# Patient Record
Sex: FEMALE | Race: BLACK OR AFRICAN AMERICAN | NOT HISPANIC OR LATINO | Employment: UNEMPLOYED | URBAN - METROPOLITAN AREA
[De-identification: names, ages, dates, MRNs, and addresses within clinical notes are randomized per-mention and may not be internally consistent; named-entity substitution may affect disease eponyms.]

---

## 2023-03-22 ENCOUNTER — PATIENT OUTREACH (OUTPATIENT)
Dept: CARE COORDINATION | Facility: CLINIC | Age: 23
End: 2023-03-22

## 2023-10-14 PROBLEM — E66.9 OBESITY: Status: ACTIVE | Noted: 2023-10-14

## 2023-10-14 PROBLEM — R41.840 ATTENTION DISTURBANCE: Status: ACTIVE | Noted: 2023-10-14

## 2023-10-14 RX ORDER — CETIRIZINE HYDROCHLORIDE, PSEUDOEPHEDRINE HYDROCHLORIDE 5; 120 MG/1; MG/1
TABLET, FILM COATED, EXTENDED RELEASE ORAL
COMMUNITY
Start: 2023-03-20

## 2023-10-14 RX ORDER — LABETALOL 200 MG/1
TABLET, FILM COATED ORAL
COMMUNITY
Start: 2023-03-20

## 2023-10-14 RX ORDER — ACETAMINOPHEN 325 MG/1
TABLET ORAL
COMMUNITY
Start: 2023-03-20

## 2023-10-14 RX ORDER — PRENATAL VIT NO.126/IRON/FOLIC 28MG-0.8MG
1 TABLET ORAL DAILY
COMMUNITY
Start: 2022-10-10

## 2023-10-14 RX ORDER — NIFEDIPINE 60 MG/1
TABLET, EXTENDED RELEASE ORAL
COMMUNITY
Start: 2023-03-20

## 2023-10-14 RX ORDER — TRIAMCINOLONE ACETONIDE 1 MG/G
CREAM TOPICAL
COMMUNITY
Start: 2016-01-04

## 2023-10-14 RX ORDER — ASPIRIN 81 MG/1
2 TABLET ORAL DAILY
COMMUNITY
Start: 2022-10-10

## 2023-10-14 RX ORDER — FERROUS SULFATE TAB 325 MG (65 MG ELEMENTAL FE) 325 (65 FE) MG
TAB ORAL
COMMUNITY
Start: 2023-03-20

## 2023-10-14 RX ORDER — DOXYLAMINE SUCCINATE 25 MG
TABLET ORAL
COMMUNITY
Start: 2022-11-09

## 2023-10-14 RX ORDER — POLYETHYLENE GLYCOL 3350 17 G/17G
POWDER, FOR SOLUTION ORAL
COMMUNITY
Start: 2023-03-20

## 2023-10-14 RX ORDER — ONDANSETRON 4 MG/1
8 TABLET, FILM COATED ORAL EVERY 8 HOURS
COMMUNITY
Start: 2022-10-10

## 2023-10-14 RX ORDER — KETOCONAZOLE 20 MG/ML
SHAMPOO, SUSPENSION TOPICAL
COMMUNITY
Start: 2016-01-04

## 2023-10-14 RX ORDER — PNV NO.95/FERROUS FUM/FOLIC AC 28MG-0.8MG
TABLET ORAL
COMMUNITY
Start: 2023-01-30

## 2023-10-14 RX ORDER — IBUPROFEN 600 MG/1
TABLET ORAL
COMMUNITY
Start: 2023-03-20

## 2023-10-16 ENCOUNTER — APPOINTMENT (OUTPATIENT)
Dept: OBSTETRICS AND GYNECOLOGY | Facility: CLINIC | Age: 23
End: 2023-10-16
Payer: COMMERCIAL

## 2023-10-30 ENCOUNTER — APPOINTMENT (OUTPATIENT)
Dept: OBSTETRICS AND GYNECOLOGY | Facility: CLINIC | Age: 23
End: 2023-10-30
Payer: COMMERCIAL

## 2023-10-30 ENCOUNTER — LAB (OUTPATIENT)
Dept: LAB | Facility: LAB | Age: 23
End: 2023-10-30
Payer: COMMERCIAL

## 2023-10-30 ENCOUNTER — OFFICE VISIT (OUTPATIENT)
Dept: OBSTETRICS AND GYNECOLOGY | Facility: CLINIC | Age: 23
End: 2023-10-30
Payer: COMMERCIAL

## 2023-10-30 ENCOUNTER — PHARMACY VISIT (OUTPATIENT)
Dept: PHARMACY | Facility: CLINIC | Age: 23
End: 2023-10-30
Payer: MEDICAID

## 2023-10-30 VITALS — WEIGHT: 270 LBS | DIASTOLIC BLOOD PRESSURE: 86 MMHG | SYSTOLIC BLOOD PRESSURE: 134 MMHG | BODY MASS INDEX: 43.7 KG/M2

## 2023-10-30 DIAGNOSIS — R03.0 ELEVATED BP WITHOUT DIAGNOSIS OF HYPERTENSION: ICD-10-CM

## 2023-10-30 DIAGNOSIS — L68.0 HIRSUTISM: ICD-10-CM

## 2023-10-30 DIAGNOSIS — E66.01 CLASS 3 SEVERE OBESITY WITHOUT SERIOUS COMORBIDITY WITH BODY MASS INDEX (BMI) OF 40.0 TO 44.9 IN ADULT, UNSPECIFIED OBESITY TYPE (MULTI): ICD-10-CM

## 2023-10-30 DIAGNOSIS — L68.0 HIRSUTISM: Primary | ICD-10-CM

## 2023-10-30 DIAGNOSIS — Z11.3 ROUTINE SCREENING FOR STI (SEXUALLY TRANSMITTED INFECTION): ICD-10-CM

## 2023-10-30 LAB
ALBUMIN SERPL BCP-MCNC: 4.5 G/DL (ref 3.4–5)
ALP SERPL-CCNC: 84 U/L (ref 33–110)
ALT SERPL W P-5'-P-CCNC: 16 U/L (ref 7–45)
ANION GAP SERPL CALC-SCNC: 14 MMOL/L (ref 10–20)
AST SERPL W P-5'-P-CCNC: 13 U/L (ref 9–39)
BASOPHILS # BLD AUTO: 0.01 X10*3/UL (ref 0–0.1)
BASOPHILS NFR BLD AUTO: 0.2 %
BILIRUB SERPL-MCNC: 0.2 MG/DL (ref 0–1.2)
BUN SERPL-MCNC: 16 MG/DL (ref 6–23)
CALCIUM SERPL-MCNC: 9.7 MG/DL (ref 8.6–10.6)
CHLORIDE SERPL-SCNC: 105 MMOL/L (ref 98–107)
CO2 SERPL-SCNC: 24 MMOL/L (ref 21–32)
CREAT SERPL-MCNC: 0.77 MG/DL (ref 0.5–1.05)
DHEA-S SERPL-MCNC: 442 UG/DL (ref 65–395)
EOSINOPHIL # BLD AUTO: 0.02 X10*3/UL (ref 0–0.7)
EOSINOPHIL NFR BLD AUTO: 0.3 %
ERYTHROCYTE [DISTWIDTH] IN BLOOD BY AUTOMATED COUNT: 15.1 % (ref 11.5–14.5)
EST. AVERAGE GLUCOSE BLD GHB EST-MCNC: 114 MG/DL
GFR SERPL CREATININE-BSD FRML MDRD: >90 ML/MIN/1.73M*2
GLUCOSE SERPL-MCNC: 85 MG/DL (ref 74–99)
HBA1C MFR BLD: 5.6 %
HCT VFR BLD AUTO: 33.7 % (ref 36–46)
HGB BLD-MCNC: 10.6 G/DL (ref 12–16)
HIV 1+2 AB+HIV1 P24 AG SERPL QL IA: NONREACTIVE
IMM GRANULOCYTES # BLD AUTO: 0.01 X10*3/UL (ref 0–0.7)
IMM GRANULOCYTES NFR BLD AUTO: 0.2 % (ref 0–0.9)
LYMPHOCYTES # BLD AUTO: 2.91 X10*3/UL (ref 1.2–4.8)
LYMPHOCYTES NFR BLD AUTO: 47.3 %
MCH RBC QN AUTO: 26.5 PG (ref 26–34)
MCHC RBC AUTO-ENTMCNC: 31.5 G/DL (ref 32–36)
MCV RBC AUTO: 84 FL (ref 80–100)
MONOCYTES # BLD AUTO: 0.26 X10*3/UL (ref 0.1–1)
MONOCYTES NFR BLD AUTO: 4.2 %
NEUTROPHILS # BLD AUTO: 2.94 X10*3/UL (ref 1.2–7.7)
NEUTROPHILS NFR BLD AUTO: 47.8 %
NRBC BLD-RTO: 0 /100 WBCS (ref 0–0)
PLATELET # BLD AUTO: 401 X10*3/UL (ref 150–450)
PMV BLD AUTO: 10.4 FL (ref 7.5–11.5)
POTASSIUM SERPL-SCNC: 4.5 MMOL/L (ref 3.5–5.3)
PROLACTIN SERPL-MCNC: 7.9 UG/L (ref 3–20)
PROT SERPL-MCNC: 7.9 G/DL (ref 6.4–8.2)
RBC # BLD AUTO: 4 X10*6/UL (ref 4–5.2)
SODIUM SERPL-SCNC: 138 MMOL/L (ref 136–145)
T PALLIDUM AB SER QL: NONREACTIVE
TSH SERPL-ACNC: 0.75 MIU/L (ref 0.44–3.98)
WBC # BLD AUTO: 6.2 X10*3/UL (ref 4.4–11.3)

## 2023-10-30 PROCEDURE — 83498 ASY HYDROXYPROGESTERONE 17-D: CPT

## 2023-10-30 PROCEDURE — 84402 ASSAY OF FREE TESTOSTERONE: CPT

## 2023-10-30 PROCEDURE — 84443 ASSAY THYROID STIM HORMONE: CPT

## 2023-10-30 PROCEDURE — 36415 COLL VENOUS BLD VENIPUNCTURE: CPT

## 2023-10-30 PROCEDURE — 99395 PREV VISIT EST AGE 18-39: CPT | Mod: GC

## 2023-10-30 PROCEDURE — 99395 PREV VISIT EST AGE 18-39: CPT

## 2023-10-30 PROCEDURE — 82627 DEHYDROEPIANDROSTERONE: CPT

## 2023-10-30 PROCEDURE — 86780 TREPONEMA PALLIDUM: CPT

## 2023-10-30 PROCEDURE — 85025 COMPLETE CBC W/AUTO DIFF WBC: CPT

## 2023-10-30 PROCEDURE — 80053 COMPREHEN METABOLIC PANEL: CPT

## 2023-10-30 PROCEDURE — 87389 HIV-1 AG W/HIV-1&-2 AB AG IA: CPT

## 2023-10-30 PROCEDURE — 84146 ASSAY OF PROLACTIN: CPT

## 2023-10-30 PROCEDURE — 83036 HEMOGLOBIN GLYCOSYLATED A1C: CPT

## 2023-10-30 PROCEDURE — 3008F BODY MASS INDEX DOCD: CPT

## 2023-10-30 ASSESSMENT — PAIN SCALES - GENERAL: PAINLEVEL: 0-NO PAIN

## 2023-10-30 ASSESSMENT — ENCOUNTER SYMPTOMS: CONSTITUTIONAL NEGATIVE: 1

## 2023-10-30 NOTE — PROGRESS NOTES
Chief complaint: Annual visit    Assessment/Plan:    Natty Coffman is a 22 y.o.  who presents for annual gyn exam.      Hirsutism  - Differential includes PCOS, androgen-secreting tumor, CAH. Clinical picture not consistent with PCOS given regular menses   - 17OHP, TSH, prolactin, testosterone, DHEA, A1c sent  - TVUS ordered     Preventive health  - Pap due: No, Negative in , next pap,   - HPV vaccine  complete  - mammogram due: N/A  - colonoscopy due N/A  - STI screening today: Chlamydia, gonorrhea, trichomonas, HIV, Syphilis  - vaccines: declines flu and covid vaccines today    Reproductive Life Planning  - not sexually active at this time, partner is away  - declines birth control at this time  - PNV sent to pharmacy as pt desired pregnancy in the future    Elevated blood pressure w/o diagnosis of HTN  - referral to primary care for BP management  - continue home BP monitoring    Obesity  - referral to nutrition    Seen and Discussed with  and Dr. Deo Benitez, MS3    Patient seen and evaluated with medical student. Agree with assessment above, edits made within text.  Tessie Desouza MD  PGY-2, Obstetrics and Gynecology    -------------------------------------  HPI    Patient presents for annual visit.     Does endorse some increased chin and chest hair that she has always had but has worsened since she delivered her son.   Does not endorse any fever/chills, abdominal pain, dysuria, abnormal discharge, or pelvic pain.    Not breast feeding. Currently not on any contraception. Was on nexplanon (ages 15-18) and depo (ages 18-20). With nexplanon, she had long duration of bleeding for 3 months. Depo shot made her gain weight and have mood swings. After birth of her son, tried OCPs but was forgetting to take it.     Menses are regular (28-30 days) and last 7 days.     OB History    Para Term  AB Living   1 1   1   1   SAB IAB Ectopic Multiple Live Births            1      # Outcome Date GA Lbr Raffaele/2nd Weight Sex Delivery Anes PTL Lv   1  2023 29w5d    CS-LTranv         Birth Comments: sPEC       Gynecologic History:    Menarche: 11  Menses: before birth, every 30 days, last 7 days, heavy, some pain and cramping   Last Pap:   HPV Vax: complete  History of Dysplasia: No  STI history: Had chlamydia and trichomonas, was fully treated  Sexually active: In the past year sexually active with one partner, but not recently and not frequent.  Contraception: Not right now, was on nexplanon (ages 15-18) and depo (ages 18-20). With nexplanon, she had long duration of bled. Depo shot made her gain weight and had mood swing. After birth of her son, tried OCPs but was forgetting.     OB History:   OB History    Para Term  AB Living   1 1   1   1   SAB IAB Ectopic Multiple Live Births           1      # Outcome Date GA Lbr Raffaele/2nd Weight Sex Delivery Anes PTL Lv   1  2023 29w5d    CS-LTranv         Birth Comments: sPEC       Medical History:   Past Medical History:   Diagnosis Date    Dysmenorrhea, unspecified 2018    Menstrual cramps    Pityriasis versicolor 2016        Surgical History:   Past Surgical History:   Procedure Laterality Date     SECTION, LOW TRANSVERSE  2022    MR HEAD ANGIO WO IV CONTRAST  2023    MR HEAD ANGIO WO IV CONTRAST [facility]      Social History:  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    Objective:  Vitals:    10/30/23 1155   BP: 134/86       Physical Exam  Constitutional:       Appearance: Normal appearance.   Genitourinary:      Vulva and rectum normal.      No lesions in the vagina.      Genitourinary Comments:  Difficult exam due to high tone pelvic floor and patient habitus      Right Labia: No rash, lesions or skin changes.     Left Labia: No lesions, skin changes or rash.     No vaginal discharge.        Right Adnexa: no mass present.     Left Adnexa: no mass present.     Cervix is not absent.      Uterus is not enlarged or tender.      No uterine mass detected.     Uterus is not absent.  HENT:      Head: Normocephalic.      Right Ear: External ear normal.      Left Ear: External ear normal.      Nose: Nose normal.      Mouth/Throat:      Mouth: Mucous membranes are moist.   Eyes:      Extraocular Movements: Extraocular movements intact.      Conjunctiva/sclera: Conjunctivae normal.   Cardiovascular:      Rate and Rhythm: Normal rate.   Pulmonary:      Effort: Pulmonary effort is normal. No respiratory distress.   Abdominal:      General: There is no distension.      Palpations: Abdomen is soft. There is no mass.      Tenderness: There is no abdominal tenderness. There is no guarding.   Musculoskeletal:      Cervical back: Neck supple.   Neurological:      General: No focal deficit present.      Mental Status: She is alert and oriented to person, place, and time.   Skin:     General: Skin is warm and dry.      Comments: Coarse dark hair noted under chin and on chest   Psychiatric:         Mood and Affect: Mood normal.         Behavior: Behavior normal.

## 2023-11-04 LAB
17OHP SERPL-MCNC: 14.7 NG/DL
TESTOSTERONE FREE (CHAN): 4.4 PG/ML (ref 0.1–6.4)
TESTOSTERONE,TOTAL,LC-MS/MS: 26 NG/DL (ref 2–45)

## 2023-12-01 ENCOUNTER — DOCUMENTATION (OUTPATIENT)
Dept: OBSTETRICS AND GYNECOLOGY | Facility: HOSPITAL | Age: 23
End: 2023-12-01
Payer: COMMERCIAL

## 2023-12-20 ENCOUNTER — HOSPITAL ENCOUNTER (EMERGENCY)
Facility: HOSPITAL | Age: 23
Discharge: HOME | End: 2023-12-20
Payer: COMMERCIAL

## 2023-12-20 VITALS
WEIGHT: 255 LBS | RESPIRATION RATE: 18 BRPM | OXYGEN SATURATION: 95 % | TEMPERATURE: 97.7 F | HEIGHT: 66 IN | BODY MASS INDEX: 40.98 KG/M2 | DIASTOLIC BLOOD PRESSURE: 88 MMHG | HEART RATE: 88 BPM | SYSTOLIC BLOOD PRESSURE: 128 MMHG

## 2023-12-20 DIAGNOSIS — B96.89 BACTERIAL VAGINOSIS: Primary | ICD-10-CM

## 2023-12-20 DIAGNOSIS — N76.0 BACTERIAL VAGINOSIS: Primary | ICD-10-CM

## 2023-12-20 LAB
APPEARANCE UR: ABNORMAL
BILIRUB UR STRIP.AUTO-MCNC: NEGATIVE MG/DL
CLUE CELLS SPEC QL WET PREP: PRESENT
COLOR UR: YELLOW
GLUCOSE UR STRIP.AUTO-MCNC: NEGATIVE MG/DL
HCV AB SER QL: NONREACTIVE
HIV 1+2 AB+HIV1 P24 AG SERPL QL IA: NONREACTIVE
KETONES UR STRIP.AUTO-MCNC: ABNORMAL MG/DL
LEUKOCYTE ESTERASE UR QL STRIP.AUTO: NEGATIVE
MUCOUS THREADS #/AREA URNS AUTO: NORMAL /LPF
NITRITE UR QL STRIP.AUTO: NEGATIVE
PH UR STRIP.AUTO: 5 [PH]
PREGNANCY TEST URINE, POC: NEGATIVE
PROT UR STRIP.AUTO-MCNC: ABNORMAL MG/DL
RBC # UR STRIP.AUTO: NEGATIVE /UL
RBC #/AREA URNS AUTO: NORMAL /HPF
SP GR UR STRIP.AUTO: 1.03
SQUAMOUS #/AREA URNS AUTO: NORMAL /HPF
T PALLIDUM AB SER QL: NONREACTIVE
T VAGINALIS SPEC QL WET PREP: ABNORMAL
TRICHOMONAS REFLEX COMMENT: ABNORMAL
UROBILINOGEN UR STRIP.AUTO-MCNC: <2 MG/DL
WBC #/AREA URNS AUTO: NORMAL /HPF
WBC VAG QL WET PREP: ABNORMAL
YEAST VAG QL WET PREP: ABNORMAL

## 2023-12-20 PROCEDURE — 36415 COLL VENOUS BLD VENIPUNCTURE: CPT

## 2023-12-20 PROCEDURE — 99283 EMERGENCY DEPT VISIT LOW MDM: CPT

## 2023-12-20 PROCEDURE — 86803 HEPATITIS C AB TEST: CPT

## 2023-12-20 PROCEDURE — 87389 HIV-1 AG W/HIV-1&-2 AB AG IA: CPT

## 2023-12-20 PROCEDURE — 87210 SMEAR WET MOUNT SALINE/INK: CPT

## 2023-12-20 PROCEDURE — 87800 DETECT AGNT MULT DNA DIREC: CPT

## 2023-12-20 PROCEDURE — 99284 EMERGENCY DEPT VISIT MOD MDM: CPT

## 2023-12-20 PROCEDURE — 81025 URINE PREGNANCY TEST: CPT

## 2023-12-20 PROCEDURE — 86780 TREPONEMA PALLIDUM: CPT

## 2023-12-20 PROCEDURE — 87661 TRICHOMONAS VAGINALIS AMPLIF: CPT | Mod: 59

## 2023-12-20 PROCEDURE — 81001 URINALYSIS AUTO W/SCOPE: CPT

## 2023-12-20 RX ORDER — METRONIDAZOLE 500 MG/1
500 TABLET ORAL 2 TIMES DAILY
Qty: 14 TABLET | Refills: 0 | Status: SHIPPED | OUTPATIENT
Start: 2023-12-20 | End: 2023-12-27

## 2023-12-20 ASSESSMENT — COLUMBIA-SUICIDE SEVERITY RATING SCALE - C-SSRS
6. HAVE YOU EVER DONE ANYTHING, STARTED TO DO ANYTHING, OR PREPARED TO DO ANYTHING TO END YOUR LIFE?: NO
1. IN THE PAST MONTH, HAVE YOU WISHED YOU WERE DEAD OR WISHED YOU COULD GO TO SLEEP AND NOT WAKE UP?: NO
2. HAVE YOU ACTUALLY HAD ANY THOUGHTS OF KILLING YOURSELF?: NO

## 2023-12-20 ASSESSMENT — PAIN SCALES - GENERAL: PAINLEVEL_OUTOF10: 0 - NO PAIN

## 2023-12-20 ASSESSMENT — PAIN - FUNCTIONAL ASSESSMENT: PAIN_FUNCTIONAL_ASSESSMENT: 0-10

## 2023-12-21 LAB
C TRACH RRNA SPEC QL NAA+PROBE: NEGATIVE
HOLD SPECIMEN: NORMAL
N GONORRHOEA DNA SPEC QL PROBE+SIG AMP: NEGATIVE

## 2023-12-21 NOTE — ED PROVIDER NOTES
"HPI   Chief Complaint   Patient presents with    Foreign Body in Vagina       Patient is a 24 y/o  female presenting to the ED with concerns of a foreign body in her vagina. Patient claims she inserted a tampon yesterday evening and is unsure if she ever removed it. She is also unsure if anything else was in her vagina between placing the tampon and right now. Ms. Coffman celebrated her birthday yesterday evening and has no recollection of the night. At this time, patient feels \"discomfort\" in her pelvic region. She would also like a full STD panel run. She denies pelvic pain, vaginal discharge, and abnormal urinary symptoms. Also denies fever, chills, SOB, abdominal pain, and N/V.  Patient states that she feels safe at home and is not concerned for domestic violence or rape.                No data recorded                Patient History   Past Medical History:   Diagnosis Date    Dysmenorrhea, unspecified 2018    Menstrual cramps    Pityriasis versicolor 2016     Past Surgical History:   Procedure Laterality Date     SECTION, LOW TRANSVERSE  2022    MR HEAD ANGIO WO IV CONTRAST  2023    MR HEAD ANGIO WO IV CONTRAST [facility]     Family History   Problem Relation Name Age of Onset    Diabetes Maternal Grandmother      Hypertension Paternal Grandmother       Social History     Tobacco Use    Smoking status: Former     Types: Cigarettes    Smokeless tobacco: Current    Tobacco comments:     Vapes   Vaping Use    Vaping Use: Some days   Substance Use Topics    Alcohol use: Yes     Alcohol/week: 3.0 standard drinks of alcohol     Types: 3 Standard drinks or equivalent per week    Drug use: Yes     Types: Marijuana       Physical Exam   ED Triage Vitals [23 1720]   Temp Heart Rate Resp BP   36.5 °C (97.7 °F) 88 18 128/88      SpO2 Temp Source Heart Rate Source Patient Position   95 % Temporal -- --      BP Location FiO2 (%)     -- --       Physical Exam  Vitals reviewed. "   Constitutional:       General: She is not in acute distress.     Appearance: She is not toxic-appearing.   HENT:      Head: Normocephalic and atraumatic.   Cardiovascular:      Rate and Rhythm: Normal rate and regular rhythm.   Pulmonary:      Effort: Pulmonary effort is normal. No respiratory distress.      Breath sounds: Normal breath sounds.   Abdominal:      General: Bowel sounds are normal.      Palpations: Abdomen is soft.      Tenderness: There is no abdominal tenderness. There is no guarding.   Genitourinary:     Vagina: Normal. No foreign body. No vaginal discharge.      Cervix: No discharge, friability or erythema.      Uterus: Normal.       Adnexa: Right adnexa normal and left adnexa normal.      Comments: The chaperone in the room while examining patient was Magdaleno Paige PA-C  Skin:     General: Skin is warm and dry.   Neurological:      General: No focal deficit present.      Mental Status: She is alert and oriented to person, place, and time.   Psychiatric:         Mood and Affect: Mood normal.         Behavior: Behavior normal.         Thought Content: Thought content normal.         ED Course & MDM   Diagnoses as of 12/20/23 2118   Bacterial vaginosis       Medical Decision Making  Patient is a 22 y/o  female presenting to the ED with concerns of a foreign body in her vagina. History was obtained from the patient. She inserted a tampon yesterday evening and is unsure if she ever removed it. On exam, patient is stable and nontoxic. Remainder of exam as noted above. VSS.   Labs - + clue cells on wet prep. Urinalysis and other labs reviewed & within normal limits. Gonorrhea and chlamydia results pending.  Differential Dx - foreign body, STD, UTI, pregnancy  Workup consistent with bacterial vaginosis due to + clue cells on wet prep. No foreign body was seen in the vagina on pelvic exam. Patient claims to no longer feels discomfort in the vagina following pelvic exam and swabs.  Gonorrhea and chlamydia sent out for testing. Will inform patient once results are back. Ms. Coffman will be sent home with 500 mg PO Flagyl BID x 7 days for treatment of BV.   Patient education provided along with signs/symptoms that would warrant returning to the ED.  Patient is agreeable and understanding to plan and all questions were answered to satisfaction.      I was present with the PA who participated in the documentation of this note. I have personally seen and re-examined the patient and performed the medical decision-making components (assessment and plan of care). I have reviewed the PA documentation and verified the findings in the note as written with additions or exceptions as stated in the body of this note.    Any discrepancies in the documentation below supercede the note above.    Magdaleno Paige PA-C         Procedure  Procedures     Chanelle Ashley PA-C  12/20/23 2157       Magdaleno Paige PA-C  12/21/23 0010

## 2023-12-22 LAB — T VAGINALIS RRNA SPEC QL NAA+PROBE: NEGATIVE

## 2023-12-29 ENCOUNTER — HOSPITAL ENCOUNTER (EMERGENCY)
Facility: HOSPITAL | Age: 23
Discharge: HOME | End: 2023-12-29
Payer: COMMERCIAL

## 2023-12-29 VITALS
DIASTOLIC BLOOD PRESSURE: 94 MMHG | HEIGHT: 66 IN | TEMPERATURE: 98.6 F | SYSTOLIC BLOOD PRESSURE: 142 MMHG | HEART RATE: 94 BPM | WEIGHT: 255 LBS | BODY MASS INDEX: 40.98 KG/M2 | RESPIRATION RATE: 17 BRPM | OXYGEN SATURATION: 97 %

## 2023-12-29 DIAGNOSIS — H66.003 NON-RECURRENT ACUTE SUPPURATIVE OTITIS MEDIA OF BOTH EARS WITHOUT SPONTANEOUS RUPTURE OF TYMPANIC MEMBRANES: Primary | ICD-10-CM

## 2023-12-29 LAB
FLUAV RNA RESP QL NAA+PROBE: NOT DETECTED
FLUBV RNA RESP QL NAA+PROBE: NOT DETECTED
SARS-COV-2 RNA RESP QL NAA+PROBE: NOT DETECTED

## 2023-12-29 PROCEDURE — 99283 EMERGENCY DEPT VISIT LOW MDM: CPT

## 2023-12-29 PROCEDURE — 87636 SARSCOV2 & INF A&B AMP PRB: CPT | Performed by: EMERGENCY MEDICINE

## 2023-12-29 PROCEDURE — 2500000001 HC RX 250 WO HCPCS SELF ADMINISTERED DRUGS (ALT 637 FOR MEDICARE OP): Performed by: PHYSICIAN ASSISTANT

## 2023-12-29 RX ORDER — IBUPROFEN 600 MG/1
600 TABLET ORAL EVERY 6 HOURS PRN
Qty: 28 TABLET | Refills: 0 | Status: SHIPPED | OUTPATIENT
Start: 2023-12-29 | End: 2024-01-05

## 2023-12-29 RX ORDER — AMOXICILLIN 500 MG/1
1000 CAPSULE ORAL ONCE
Status: COMPLETED | OUTPATIENT
Start: 2023-12-29 | End: 2023-12-29

## 2023-12-29 RX ORDER — AMOXICILLIN 500 MG/1
1000 CAPSULE ORAL EVERY 8 HOURS SCHEDULED
Qty: 42 CAPSULE | Refills: 0 | Status: SHIPPED | OUTPATIENT
Start: 2023-12-29 | End: 2024-01-05

## 2023-12-29 RX ADMIN — AMOXICILLIN 1000 MG: 500 CAPSULE ORAL at 17:32

## 2023-12-29 ASSESSMENT — COLUMBIA-SUICIDE SEVERITY RATING SCALE - C-SSRS
2. HAVE YOU ACTUALLY HAD ANY THOUGHTS OF KILLING YOURSELF?: NO
1. IN THE PAST MONTH, HAVE YOU WISHED YOU WERE DEAD OR WISHED YOU COULD GO TO SLEEP AND NOT WAKE UP?: NO
6. HAVE YOU EVER DONE ANYTHING, STARTED TO DO ANYTHING, OR PREPARED TO DO ANYTHING TO END YOUR LIFE?: NO

## 2023-12-30 NOTE — ED PROVIDER NOTES
HPI   Chief Complaint   Patient presents with    Flu Symptoms     Pt presents to ED for flu like sx, EDWINA ear fullness/ pain, congestion. Denies SOB/CP.       23-year-old female complains of bilateral ear fullness ear pain and congestion symptoms occurred over the past few days son is also here with illness.  Nothing makes better or worse.  Worsening over the past few days.  Concern for ear infection.  Occasional cough no shortness of breath no chest pain no body aches                          No data recorded                Patient History   Past Medical History:   Diagnosis Date    Dysmenorrhea, unspecified 2018    Menstrual cramps    Pityriasis versicolor 2016     Past Surgical History:   Procedure Laterality Date     SECTION, LOW TRANSVERSE  2022    MR HEAD ANGIO WO IV CONTRAST  2023    MR HEAD ANGIO WO IV CONTRAST [facility]     Family History   Problem Relation Name Age of Onset    Diabetes Maternal Grandmother      Hypertension Paternal Grandmother       Social History     Tobacco Use    Smoking status: Former     Types: Cigarettes    Smokeless tobacco: Current    Tobacco comments:     Vapes   Vaping Use    Vaping Use: Some days   Substance Use Topics    Alcohol use: Yes     Alcohol/week: 3.0 standard drinks of alcohol     Types: 3 Standard drinks or equivalent per week    Drug use: Yes     Types: Marijuana       Physical Exam   ED Triage Vitals [23 1635]   Temp Heart Rate Resp BP   37 °C (98.6 °F) 94 17 (!) 142/94      SpO2 Temp Source Heart Rate Source Patient Position   97 % Oral -- --      BP Location FiO2 (%)     -- --       Physical Exam  Vitals reviewed.   Constitutional:       General: She is not in acute distress.     Appearance: Normal appearance. She is normal weight. She is not ill-appearing, toxic-appearing or diaphoretic.   HENT:      Head: Normocephalic and atraumatic.      Right Ear: External ear normal. Tympanic membrane is injected, erythematous and bulging.       Left Ear: External ear normal. Tympanic membrane is injected, erythematous and bulging.      Nose: Nose normal.   Eyes:      Extraocular Movements: Extraocular movements intact.      Conjunctiva/sclera: Conjunctivae normal.      Pupils: Pupils are equal, round, and reactive to light.   Cardiovascular:      Rate and Rhythm: Normal rate and regular rhythm.   Pulmonary:      Effort: Pulmonary effort is normal. No respiratory distress.      Breath sounds: No stridor.   Abdominal:      General: There is no distension.   Musculoskeletal:         General: No swelling or deformity.      Cervical back: Normal range of motion.   Skin:     General: Skin is warm.      Capillary Refill: Capillary refill takes less than 2 seconds.      Findings: No rash.   Neurological:      General: No focal deficit present.      Mental Status: She is alert and oriented to person, place, and time. Mental status is at baseline.   Psychiatric:         Mood and Affect: Mood normal.         Behavior: Behavior normal.         Thought Content: Thought content normal.         Judgment: Judgment normal.         ED Course & MDM   Diagnoses as of 12/29/23 2145   Non-recurrent acute suppurative otitis media of both ears without spontaneous rupture of tympanic membranes       Medical Decision Making  Differentials otitis media versus otitis externa    Will treat with amoxicillin patient to control pain with Tylenol Motrin follow-up return for any worse concerning symptoms        Procedure  Procedures     Greg Garsia PA-C  12/29/23 2146

## 2024-01-09 ENCOUNTER — APPOINTMENT (OUTPATIENT)
Dept: PRIMARY CARE | Facility: CLINIC | Age: 24
End: 2024-01-09
Payer: COMMERCIAL

## 2024-01-12 ENCOUNTER — HOSPITAL ENCOUNTER (EMERGENCY)
Facility: HOSPITAL | Age: 24
Discharge: HOME | End: 2024-01-12
Attending: EMERGENCY MEDICINE
Payer: COMMERCIAL

## 2024-01-12 VITALS
TEMPERATURE: 97.1 F | RESPIRATION RATE: 16 BRPM | HEART RATE: 82 BPM | SYSTOLIC BLOOD PRESSURE: 129 MMHG | OXYGEN SATURATION: 99 % | DIASTOLIC BLOOD PRESSURE: 80 MMHG

## 2024-01-12 DIAGNOSIS — N76.0 BACTERIAL VAGINOSIS: Primary | ICD-10-CM

## 2024-01-12 DIAGNOSIS — B96.89 BACTERIAL VAGINOSIS: Primary | ICD-10-CM

## 2024-01-12 PROCEDURE — 99283 EMERGENCY DEPT VISIT LOW MDM: CPT | Performed by: EMERGENCY MEDICINE

## 2024-01-12 RX ORDER — METRONIDAZOLE 500 MG/1
500 TABLET ORAL 3 TIMES DAILY
Qty: 30 TABLET | Refills: 0 | Status: SHIPPED | OUTPATIENT
Start: 2024-01-12 | End: 2024-01-22

## 2024-01-12 ASSESSMENT — COLUMBIA-SUICIDE SEVERITY RATING SCALE - C-SSRS
1. IN THE PAST MONTH, HAVE YOU WISHED YOU WERE DEAD OR WISHED YOU COULD GO TO SLEEP AND NOT WAKE UP?: NO
2. HAVE YOU ACTUALLY HAD ANY THOUGHTS OF KILLING YOURSELF?: NO
6. HAVE YOU EVER DONE ANYTHING, STARTED TO DO ANYTHING, OR PREPARED TO DO ANYTHING TO END YOUR LIFE?: NO

## 2024-01-12 NOTE — ED PROVIDER NOTES
HPI   Chief Complaint   Patient presents with    Exposure to STD       HPI  Patient is a 23-year-old with no past medical history presenting with STD exposure.  Patient said on  she came to the ED for vaginal discharge.  She did an STD check and they all came back negative but was positive for BV.  She was prescribed metronidazole for 1 week.  She missed some of the days and did not take the metronidazole consistently.  She claims the vaginal discharge has not stopped and is becoming increasing itchy.  She present today to make sure that she does not have another STD.  Patient denies having any sexual encounters since had last STD check.                  Dexter Coma Scale Score: 15                  Patient History   Past Medical History:   Diagnosis Date    Dysmenorrhea, unspecified 2018    Menstrual cramps    Pityriasis versicolor 2016     Past Surgical History:   Procedure Laterality Date     SECTION, LOW TRANSVERSE  2022    MR HEAD ANGIO WO IV CONTRAST  2023    MR HEAD ANGIO WO IV CONTRAST [facility]     Family History   Problem Relation Name Age of Onset    Diabetes Maternal Grandmother      Hypertension Paternal Grandmother       Social History     Tobacco Use    Smoking status: Former     Types: Cigarettes    Smokeless tobacco: Current    Tobacco comments:     Vapes   Vaping Use    Vaping Use: Some days   Substance Use Topics    Alcohol use: Yes     Alcohol/week: 3.0 standard drinks of alcohol     Types: 3 Standard drinks or equivalent per week    Drug use: Yes     Types: Marijuana       Physical Exam   ED Triage Vitals [24 1739]   Temp Heart Rate Resp BP   36.2 °C (97.1 °F) 82 16 129/80      SpO2 Temp Source Heart Rate Source Patient Position   99 % Temporal Monitor --      BP Location FiO2 (%)     -- --       Physical Exam  Constitutional:       Appearance: Normal appearance.   HENT:      Head: Normocephalic and atraumatic.      Nose: Nose normal.   Cardiovascular:       Rate and Rhythm: Normal rate and regular rhythm.      Pulses: Normal pulses.      Heart sounds: Normal heart sounds.   Pulmonary:      Effort: Pulmonary effort is normal.      Breath sounds: Normal breath sounds.   Abdominal:      General: Abdomen is flat. Bowel sounds are normal.      Palpations: Abdomen is soft.   Genitourinary:     General: Normal vulva.      Comments: White purulent discharge. Redness outside the uvula  Musculoskeletal:         General: Normal range of motion.      Cervical back: Normal range of motion and neck supple.   Skin:     General: Skin is warm.   Neurological:      General: No focal deficit present.      Mental Status: She is alert and oriented to person, place, and time. Mental status is at baseline.         ED Course & MDM   Diagnoses as of 01/12/24 1846   Bacterial vaginosis       Medical Decision Making  Patient is a 23-year-old presenting with vaginal discharge.  Patient presented here on December 20 and had the same issue of vaginal discharge.  She was negative for gonorrhea, chlamydia, and trichomonas.  She was given metronidazole.  Patient just had an STD check and they were all negative about 2 weeks ago and since then she has not had any sexual encounter, I am comfortable with not checking for STD at this time.  Patient admits her antibiotic dose and that is the most likely reason why have BV has not resolved.  At bedside, patient appeared comfortable and alert. patient was prescribed metronidazole 10 days and discharged.    Procedure  Procedures     Eddie Tobar MD  Resident  01/12/24 1844       Eddie Tobar MD  Resident  01/12/24 1846

## 2024-01-12 NOTE — ED TRIAGE NOTES
Pt report to ED for STD check, pt states vagina itches. Pt seen 12/20 for STD. Pt was drinking while on BV medications. Pt used home methods to try to help.

## 2024-01-15 PROBLEM — M25.569 KNEE PAIN: Status: ACTIVE | Noted: 2024-01-15

## 2024-01-15 PROBLEM — R11.2 NAUSEA AND VOMITING: Status: ACTIVE | Noted: 2024-01-15

## 2024-01-15 PROBLEM — K59.00 CONSTIPATION: Status: ACTIVE | Noted: 2022-11-08

## 2024-01-15 PROBLEM — Z20.822 CONTACT WITH AND (SUSPECTED) EXPOSURE TO COVID-19: Status: ACTIVE | Noted: 2023-03-20

## 2024-01-15 PROBLEM — Z91.51 PERSONAL HISTORY OF SUICIDAL BEHAVIOR: Status: ACTIVE | Noted: 2023-03-20

## 2024-01-15 PROBLEM — F43.22 ADJUSTMENT DISORDER WITH ANXIOUS MOOD: Status: ACTIVE | Noted: 2023-03-20

## 2024-01-15 PROBLEM — R10.9 ABDOMINAL PAIN: Status: ACTIVE | Noted: 2023-01-16

## 2024-01-15 PROBLEM — R03.0 ELEVATED BLOOD PRESSURE READING WITHOUT DIAGNOSIS OF HYPERTENSION: Status: ACTIVE | Noted: 2018-11-28

## 2024-01-15 PROBLEM — O14.90 PRE-ECLAMPSIA (HHS-HCC): Status: ACTIVE | Noted: 2023-02-14

## 2024-01-15 PROBLEM — N76.0 BACTERIAL VAGINOSIS: Status: ACTIVE | Noted: 2023-12-20

## 2024-01-15 PROBLEM — H66.009 ACUTE SUPPURATIVE OTITIS MEDIA WITHOUT SPONTANEOUS RUPTURE OF EAR DRUM: Status: ACTIVE | Noted: 2024-01-15

## 2024-01-15 PROBLEM — B96.89 BACTERIAL VAGINOSIS: Status: ACTIVE | Noted: 2023-12-20

## 2024-01-15 PROBLEM — J10.1 INFLUENZA DUE TO INFLUENZA A VIRUS: Status: ACTIVE | Noted: 2024-01-15

## 2024-03-27 ENCOUNTER — PHARMACY VISIT (OUTPATIENT)
Dept: PHARMACY | Facility: CLINIC | Age: 24
End: 2024-03-27
Payer: MEDICAID

## 2024-03-27 PROCEDURE — RXMED WILLOW AMBULATORY MEDICATION CHARGE

## 2024-11-18 ENCOUNTER — OFFICE VISIT (OUTPATIENT)
Dept: OBSTETRICS AND GYNECOLOGY | Facility: CLINIC | Age: 24
End: 2024-11-18
Payer: COMMERCIAL

## 2024-11-18 ENCOUNTER — LAB (OUTPATIENT)
Dept: LAB | Facility: LAB | Age: 24
End: 2024-11-18
Payer: COMMERCIAL

## 2024-11-18 VITALS
DIASTOLIC BLOOD PRESSURE: 90 MMHG | BODY MASS INDEX: 43.37 KG/M2 | HEIGHT: 66 IN | WEIGHT: 269.9 LBS | SYSTOLIC BLOOD PRESSURE: 128 MMHG | HEART RATE: 85 BPM

## 2024-11-18 DIAGNOSIS — Z30.09 ENCOUNTER FOR COUNSELING REGARDING CONTRACEPTION: ICD-10-CM

## 2024-11-18 DIAGNOSIS — Z11.3 ROUTINE SCREENING FOR STI (SEXUALLY TRANSMITTED INFECTION): ICD-10-CM

## 2024-11-18 DIAGNOSIS — Z01.419 ENCOUNTER FOR GYNECOLOGICAL EXAMINATION WITHOUT ABNORMAL FINDING: Primary | ICD-10-CM

## 2024-11-18 DIAGNOSIS — L30.8 OTHER ECZEMA: ICD-10-CM

## 2024-11-18 DIAGNOSIS — G47.9 SLEEP DIFFICULTIES: ICD-10-CM

## 2024-11-18 PROCEDURE — 86803 HEPATITIS C AB TEST: CPT

## 2024-11-18 PROCEDURE — 3074F SYST BP LT 130 MM HG: CPT | Performed by: ADVANCED PRACTICE MIDWIFE

## 2024-11-18 PROCEDURE — 99395 PREV VISIT EST AGE 18-39: CPT | Performed by: ADVANCED PRACTICE MIDWIFE

## 2024-11-18 PROCEDURE — 87389 HIV-1 AG W/HIV-1&-2 AB AG IA: CPT

## 2024-11-18 PROCEDURE — 87661 TRICHOMONAS VAGINALIS AMPLIF: CPT | Performed by: ADVANCED PRACTICE MIDWIFE

## 2024-11-18 PROCEDURE — 87491 CHLMYD TRACH DNA AMP PROBE: CPT | Performed by: ADVANCED PRACTICE MIDWIFE

## 2024-11-18 PROCEDURE — 3008F BODY MASS INDEX DOCD: CPT | Performed by: ADVANCED PRACTICE MIDWIFE

## 2024-11-18 PROCEDURE — 3080F DIAST BP >= 90 MM HG: CPT | Performed by: ADVANCED PRACTICE MIDWIFE

## 2024-11-18 PROCEDURE — 86780 TREPONEMA PALLIDUM: CPT

## 2024-11-18 PROCEDURE — 36415 COLL VENOUS BLD VENIPUNCTURE: CPT

## 2024-11-18 ASSESSMENT — PATIENT HEALTH QUESTIONNAIRE - PHQ9
1. LITTLE INTEREST OR PLEASURE IN DOING THINGS: NOT AT ALL
2. FEELING DOWN, DEPRESSED OR HOPELESS: NOT AT ALL
SUM OF ALL RESPONSES TO PHQ9 QUESTIONS 1 AND 2: 0

## 2024-11-18 ASSESSMENT — ENCOUNTER SYMPTOMS
RESPIRATORY NEGATIVE: 0
NEUROLOGICAL NEGATIVE: 0
GASTROINTESTINAL NEGATIVE: 0
HEMATOLOGIC/LYMPHATIC NEGATIVE: 0
CARDIOVASCULAR NEGATIVE: 0
ENDOCRINE NEGATIVE: 0
PSYCHIATRIC NEGATIVE: 0
EYES NEGATIVE: 0
MUSCULOSKELETAL NEGATIVE: 0
CONSTITUTIONAL NEGATIVE: 0
ALLERGIC/IMMUNOLOGIC NEGATIVE: 0

## 2024-11-18 ASSESSMENT — PAIN SCALES - GENERAL: PAINLEVEL_OUTOF10: 0-NO PAIN

## 2024-11-18 NOTE — PROGRESS NOTES
"Subjective   Natty Coffman \"Natty coffman\" is a 23 y.o. female who is here for Annual Exam (Patient here for her annual and would like sti checking blood and urine. Patient thinks that she is narcultic.no pain no falls. Last pap 2022 due 2025 and last lmp 2024.   ).     Concerns today:  Sleep concerns, worries she is narcoleptic?    Periods are regular every 35 days, lasting 7 days.   Dysmenorrhea: none.   Cyclic symptoms include none.      Sexual Activity: sexually active, male and female partners  Pain with intercourse? No   Loss of desire? No   Able to have an orgasm? Yes     History of prior STI: chlamydia and trichomonas  Desires STI screening? Yes    Current contraception: condoms    Last pap: 2022  History of abnormal Pap smear: no  Family history of uterine or ovarian cancer: no  Family history of breast cancer: no  OB History    Para Term  AB Living   1 1 0 1 0 1   SAB IAB Ectopic Multiple Live Births   0 0 0 0 1      Objective   /90   Pulse 85   Ht 1.676 m (5' 6\")   Wt 122 kg (269 lb 14.4 oz)   LMP 2024    Physical Exam  Constitutional:       General: She is not in acute distress.     Appearance: Normal appearance. She is well-developed.   Genitourinary:      Urethral meatus normal.      No lesions in the vagina.      Right Labia: No rash, lesions or skin changes.     Left Labia: No lesions, skin changes or rash.     No vaginal discharge, erythema or bleeding.      No vaginal prolapse present.     No vaginal atrophy present.       Right Adnexa: not tender and no mass present.     Left Adnexa: not tender and no mass present.     Cervix is parous.      No cervical motion tenderness, discharge, friability or lesion.      Uterus is not fixed, tender or irregular.      No uterine mass detected.     Uterus is anteverted.      Pelvic exam was performed with patient in the lithotomy position.   Breasts:     Right: Normal.      Left: Normal.   Cardiovascular:      " Heart sounds: Normal heart sounds.   Pulmonary:      Effort: Pulmonary effort is normal.      Breath sounds: Normal breath sounds.   Abdominal:      Palpations: Abdomen is soft. There is no mass.      Tenderness: There is no abdominal tenderness.   Neurological:      General: No focal deficit present.   Skin:     General: Skin is warm and dry.   Psychiatric:         Mood and Affect: Mood and affect normal.         Behavior: Behavior is cooperative.   Vitals reviewed.        Assessment/Plan   Diagnoses and all orders for this visit:  Encounter for gynecological examination without abnormal finding  -     routine AE  -     healthy lifestyle encouraged  -     pap due 6/2025  -     condoms as needed for STI prevention  -     Gardasil UTD  -     Recommend PCP visit for routine health maintenance  -     RTO 1 year for AE or sooner PRN  Encounter for counseling regarding contraception        -      has hx migraine with aura, recommend progesterone only or non hormonal contraception        -      currently using condoms when SA with male partners, will continue this method and consider options  Sleep difficulties  -     Referral to Adult Sleep Medicine; Future  Routine screening for STI (sexually transmitted infection)  -     HIV 1/2 Antigen/Antibody Screen with Reflex to Confirmation; Future  -     Trichomonas vaginalis, Amplified  -     Syphilis Screen with Reflex; Future  -     Hepatitis C Antibody; Future  -     C. trachomatis / N. gonorrhoeae, Amplified  Other eczema  -     white petrolatum 41 % ointment ointment; Apply 1 Application topically every 1 hour if needed (eczema flare).    Follow up in about 1 year (around 11/18/2025) for Annual Exam.  Soraya Gunter, APRN-CNM, APRN-CNP

## 2024-11-19 LAB
C TRACH RRNA SPEC QL NAA+PROBE: NEGATIVE
HCV AB SER QL: NONREACTIVE
HIV 1+2 AB+HIV1 P24 AG SERPL QL IA: NONREACTIVE
N GONORRHOEA DNA SPEC QL PROBE+SIG AMP: NEGATIVE
T VAGINALIS RRNA SPEC QL NAA+PROBE: NEGATIVE
TREPONEMA PALLIDUM IGG+IGM AB [PRESENCE] IN SERUM OR PLASMA BY IMMUNOASSAY: NONREACTIVE

## 2024-12-01 NOTE — PROGRESS NOTES
"   Patient: Natty Coffman  : 2000 AGE: 23 y.o. SEX:female   MRN: [id number]   Provider: MICHELINE Pozo     Location [facility]   Service Date: 2024     PCP: Lexy Suarez MD   Referred by: Soraya Gunter APRN-CNM*          [facility]  New Visit Note      HISTORY OF PRESENT ILLNESS     Natty Coffman \"Natty coffman\" is a 23 y.o. female with a h/o Obesity and HTN in pregnancy  who presents to [facility].    24: NPV, referred by midwife with concerns of EDS, patient worries she is narcoleptic.  Patient presents with sister today in interview.  Patient reports falling asleep mid conversation, dozing off at work, drowsy driving. Symptoms started 2-3 years ago. Reports vivid dreams, dreams quickly, sleep paralysis, gypnogogic and hypnopompic hallucinations, and cataplexy symptoms; feeling\" faint/weakness\" brought on by laughter.  Also reports loud snoring, witnessed apneas, occasional gasping/choking for air. ----> PSG/MSLT ordered      SLEEP-WAKE SCHEDULE    Sleep Patterns: She does not have a usual bed partner. In terms of the patient's sleep/wake cycle, she generally gets into bed at approximately 10 PM.  her latency to sleep onset after lights out is quick. During the night, the patient generally awakens 2-3 times nightly. These awakenings are usually brief in duration. Final wake time on weekday mornings is around 7 AM. Naps 2x/day for 10-40 minutes which is refreshing.     Compared to weekdays, the patient's sleep schedule is  similar on the weekends. Wake time 9AM.    Breathing during sleep: snoring, witnessed apneas, gasping/choking for air, orthopnea, nasal congestion, and nocturnal reflux symptoms  Behaviors at night: No   Sleep paralysis: Yes; almost nightly   Hypnogogic or hypnopompic hallucinations: Yes, both   Cataplexy: Yes, feeling weak in the knees with laughter     Leg symptoms and timing:  - " Sensations: Patient does not have unusual sensations in their extremities that cause an urge to move them   - Movement: Patient has not been told that their legs kick or jerk during sleep    Daytime Symptoms:  On awakening patient reports: wake unrefreshed, feels sleepy, morning dry mouth, and hard to get out of bed  Patient report some daytime symptoms including: DAYTIME SYMPTOMS: reports excessive daytime sleepiness sleep inertia irritability during the day difficulty with memory or concentration during the day feeling sleepy when driving    Sleep environment:  Preferred sleep position: back, stomach, and side  Room is dark: Yes  Room is quiet: Yes  Room is cool: Yes  Bed comfort: good    SLEEP HABITS  Caffeine consumption: Yes, rarely (occasional)  Alcohol consumption: No  Smoking: Yes; vapes nicotine   Marijuana: Yes, daily   Sleep aids: denies     WEIGHT: gained 40lbs in 1.5 yrs      ESS: 24  OLIVIA: 18  STOP-BAN    REVIEW OF SYSTEMS     All other systems have been reviewed and are negative.    ALLERGIES     Allergies   Allergen Reactions    Nickel Rash       MEDICATIONS     Current Outpatient Medications   Medication Sig Dispense Refill    acetaminophen (Tylenol) 325 mg tablet       aspirin 81 mg EC tablet Take 2 tablets (162 mg) by mouth once daily.      diphenhydramine-zinc acetate (BENADryl) cream Take 5 mL by mouth every 6 hours.      docusate sodium (Colace) 100 mg capsule Take by mouth.      drospirenone, contraceptive, 4 mg (28) tablet TAKE 1 TABLET BY MOUTH ONCE DAILY 28 tablet 11    FeroSuL 325 mg (65 mg iron) tablet        mg tablet       ketoconazole (NIZOral) 2 % shampoo 1 Application      labetalol (Normodyne) 200 mg tablet       levonorgestrel (Plan B) 1.5 mg tablet Take by mouth.      metoclopramide (Reglan) 10 mg tablet Take by mouth.      NIFEdipine XL 60 mg 24 hr tablet       ondansetron (Zofran) 4 mg tablet Take 2 tablets (8 mg) by mouth every 8 hours.      PNV  "no.499-PT-dk4-dha-epa-fish 400 mcg-35 mg- 25 mg-5 mg tablet,chewable Chew.      polyethylene glycol (Glycolax, Miralax) 17 gram/dose powder       Prenatal 28 mg iron- 800 mcg tablet       prenatal vitamin (Classic Prenatal) 28 mg iron-800 mcg folic acid tablet tablet Take 1 tablet by mouth once daily.      Senna Plus 8.6-50 mg tablet       Sleep Aid, doxylamine, 25 mg tablet Take by mouth.      triamcinolone (Kenalog) 0.1 % cream 1 Application      ulipristal (Lizet) 30 mg tablet Take by mouth.      white petrolatum 41 % ointment ointment Apply 1 Application topically every 1 hour if needed (eczema flare). 80 g 1     No current facility-administered medications for this visit.       PAST HISTORIES     PERTINENT PAST MEDICAL HISTORY: See HPI    PERTINENT PAST SURGICAL HISTORY for Sleep Medicine:  non-contributory    PERTINENT FAMILY HISTORY for Sleep Medicine:  sleep apnea- grandmother     PERTINENT SOCIAL HISTORY:  She  reports that she has quit smoking. Her smoking use included cigarettes. She uses smokeless tobacco. She reports current alcohol use of about 3.0 standard drinks of alcohol per week. She reports current drug use. Drug: Marijuana. She currently lives with family.     Active Problems, Allergy List, Medication List, and PMH/PSH/FH/Social Hx have been reviewed and reconciled in chart. No significant changes unless documented in the pertinent chart section. Updates made when necessary.     PHYSICAL EXAM     VITAL SIGNS: /83   Pulse 81   Temp 35.9 °C (96.7 °F)   Resp 16   Ht 1.702 m (5' 7\")   Wt 124 kg (274 lb)   LMP 11/12/2024   SpO2 98%   BMI 42.91 kg/m²     NECK CIRCUMFERENCE: 44cm    CURRENT WEIGHT:   Vitals:    12/02/24 0919   Weight: 124 kg (274 lb)      PREVIOUS WEIGHTS:  Wt Readings from Last 3 Encounters:   12/02/24 124 kg (274 lb)   11/18/24 122 kg (269 lb 14.4 oz)   12/29/23 116 kg (255 lb)     Physical Exam  Constitutional: Awake, not in distress  Lungs: Clear to auscultation " "bilateral, no cough noted  Heart: Regular rate and rhythm  Skin: Warm, no rash  Neuro: No tremors, moves all extremities  Psych: alert and oriented to time, place, and person    HEENT:   Tonsils enlargement grade 2+   Airway comments: narrow lateral walls   Tongue scalloping: slight   Modified Mallampati score - 2-3    RESULTS/DATA     No results found for: \"IRON\", \"TRANSFERRIN\", \"IRONSAT\", \"TIBC\", \"FERRITIN\"    Bicarbonate   Date Value Ref Range Status   10/30/2023 24 21 - 32 mmol/L Final       ASSESSMENT/PLAN     Ms. Coffman is a 23 y.o. female and She was referred to the [facility] for evaluation of possible sleep disordered breathing and Narcolepsy    Problem List, Orders, Assessment, Recommendations:    #Sleep disordered breathing/suspected ISMAEL/Hypersomnia/EDS/suspected narcolepsy  - Due to high pre-test probability and possible concomitant sleep disorder as well as insurance requirement, obtain overnight PSG to evaluate for ISMAEL. If no ISMAEL, will proceed with MSLT the following day to evaluate for narcolepsy or idiopathic hypersomnia  - Snoring, witnessed apneas  - EDS with ESS of 24 today. + sleep paralysis, + vivid dreams, dreams upon falling asleep, + cataplexy   - Provided sleep logs- please fill out for 2 weeks prior to sleep studies and bring them with you to give to the sleep technician.    - A urine tox screen will be done on morning before the daytime sleep study starts.  - The MSLT will be canceled if the overnight sleep study shows significant sleep apnea or inadequate sleep.  - not taking any medications that would affect testing  - Avoid driving if drowsy. Would recommend that if you are dozing off while driving, that you do not drive until your sleepiness is appropriately treated.   - Follow up after sleep study to review results and determine plan of care   -Diet, exercise, and weight loss were emphasized today in clinic, as were non-supine sleep, avoiding alcohol in " the late evening, and driving or operating heavy machinery when sleepy.      #Obesity  BMI Readings from Last 1 Encounters:   12/02/24 42.91 kg/m²     - Encouraged healthy weight loss via diet and exercise  - Weight loss can help in the long term treatment of ISMAEL.  - Defer management to PCP     All of patient's questions were answered. She verbalizes understanding and agreement with my assessment and plan.    Disposition    Follow up after sleep study    I personally spent 45 minutes today (exclusive of procedures) providing care for this patient, including preparation, face to face time, EMR documentation and other services such as review of medical records, diagnostic results, patient education, counseling, and coordination of care.

## 2024-12-02 ENCOUNTER — OFFICE VISIT (OUTPATIENT)
Facility: CLINIC | Age: 24
End: 2024-12-02
Payer: COMMERCIAL

## 2024-12-02 VITALS
RESPIRATION RATE: 16 BRPM | OXYGEN SATURATION: 98 % | TEMPERATURE: 96.7 F | DIASTOLIC BLOOD PRESSURE: 83 MMHG | BODY MASS INDEX: 43 KG/M2 | WEIGHT: 274 LBS | HEART RATE: 81 BPM | HEIGHT: 67 IN | SYSTOLIC BLOOD PRESSURE: 137 MMHG

## 2024-12-02 DIAGNOSIS — R44.2 HYPNAPOMPIC HALLUCINATIONS: ICD-10-CM

## 2024-12-02 DIAGNOSIS — G47.30 SLEEP DISORDER BREATHING: Primary | ICD-10-CM

## 2024-12-02 DIAGNOSIS — G47.9 SLEEP DIFFICULTIES: ICD-10-CM

## 2024-12-02 DIAGNOSIS — E66.01 MORBID OBESITY WITH BMI OF 40.0-44.9, ADULT (MULTI): ICD-10-CM

## 2024-12-02 DIAGNOSIS — G47.19 EXCESSIVE DAYTIME SLEEPINESS: ICD-10-CM

## 2024-12-02 PROCEDURE — 3075F SYST BP GE 130 - 139MM HG: CPT | Performed by: NURSE PRACTITIONER

## 2024-12-02 PROCEDURE — 99214 OFFICE O/P EST MOD 30 MIN: CPT | Performed by: NURSE PRACTITIONER

## 2024-12-02 PROCEDURE — 3008F BODY MASS INDEX DOCD: CPT | Performed by: NURSE PRACTITIONER

## 2024-12-02 PROCEDURE — 99204 OFFICE O/P NEW MOD 45 MIN: CPT | Performed by: NURSE PRACTITIONER

## 2024-12-02 PROCEDURE — 3079F DIAST BP 80-89 MM HG: CPT | Performed by: NURSE PRACTITIONER

## 2024-12-02 ASSESSMENT — SLEEP AND FATIGUE QUESTIONNAIRES
SLEEP_PROBLEM_NOTICEABLE_TO_OTHERS: VERY MUCH NOTICEABLE
HOW LIKELY ARE YOU TO NOD OFF OR FALL ASLEEP WHILE SITTING QUIETLY AFTER LUNCH WITHOUT ALCOHOL: HIGH CHANCE OF DOZING
HOW LIKELY ARE YOU TO NOD OFF OR FALL ASLEEP IN A CAR, WHILE STOPPED FOR A FEW MINUTES IN TRAFFIC: HIGH CHANCE OF DOZING
SITING INACTIVE IN A PUBLIC PLACE LIKE A CLASS ROOM OR A MOVIE THEATER: HIGH CHANCE OF DOZING
WORRIED_DISTRESSED_DUE_TO_SLEEP: VERY MUCH NOTICEABLE
HOW LIKELY ARE YOU TO NOD OFF OR FALL ASLEEP WHILE SITTING AND TALKING TO SOMEONE: HIGH CHANCE OF DOZING
SLEEP_PROBLEM_INTERFERES_DAILY_ACTIVITIES: VERY MUCH NOTICEABLE
WAKING_TOO_EARLY: VERY SEVERE
DIFFICULTY_STAYING_ASLEEP: MODERATE
HOW LIKELY ARE YOU TO NOD OFF OR FALL ASLEEP WHILE SITTING AND READING: HIGH CHANCE OF DOZING
HOW LIKELY ARE YOU TO NOD OFF OR FALL ASLEEP WHILE LYING DOWN TO REST IN THE AFTERNOON WHEN CIRCUMSTANCES PERMIT: HIGH CHANCE OF DOZING
HOW LIKELY ARE YOU TO NOD OFF OR FALL ASLEEP WHILE WATCHING TV: HIGH CHANCE OF DOZING
ESS-CHAD TOTAL SCORE: 24
HOW LIKELY ARE YOU TO NOD OFF OR FALL ASLEEP WHEN YOU ARE A PASSENGER IN A CAR FOR AN HOUR WITHOUT A BREAK: HIGH CHANCE OF DOZING
SATISFACTION_WITH_CURRENT_SLEEP_PATTERN: VERY SATISFIED

## 2024-12-02 NOTE — PATIENT INSTRUCTIONS
[facility]   [address]  [facility]  [address] [address]       NAME: Natty Coffman   DATE: 12/02/24    Your Sleep Provider Today: MICHELINE Pozo  Your Primary Care Physician: Lexy Suarez MD       DIAGNOSIS:   1. Sleep difficulties  Referral to Adult Sleep Medicine          Thank you for coming to the Sleep Medicine Clinic today! Your sleep medicine provider today was: MICHELINE Pozo Below is a summary of your treatment plan, other important information, and our contact numbers:      TREATMENT PLAN     - Get your sleep study scheduled  - Follow-up in 2 months.  - If not already done, sign up for 'My Chart' and send prescription requests or messages through this    Scheduling a Sleep Study    Your provider ordered a sleep apnea test today. It will usually take 2 - 3 business days for Insurance to approve the order.     Once you test is approved it will be sent to the ordering sleep lab. When the sleep lab is notified of the new order, they will reach out to you to get you scheduled for an in-lab sleep study or to get you scheduled for a pick-up date for your Home Sleep Study Kit (depending on which type of study your provider recommended).    They usually will reach out to you about 1 week after your test has been ordered. Please contact the office at [phone] if you have not heard back from them in 2 weeks after you have seen your provider. If your sleep study was ordered through  [employer] (mail away kit) you can call them at [phone] if you do not hear from them within 2 weeks.     Sleep Testing for sleep apnea: The best and ideal way to check out if you have sleep apnea is to do an overnight sleep study in the sleep laboratory. Alternatively, a home sleep apnea test can also be done depending on your insurance and risk factors.     If you are having a home sleep apnea test, kindly allot 1 hour during pickup  of the testing kit as you will have to complete paperwork and listen to the sleep technician for in-person on-the-spot demonstration and instructions on how to hook up the testing kit at home. Do the test for 1 day and start off with sleeping on your back. If you sleep on your side in the middle of night or you have always been a side or stomach-sleeper, it is ok as long as you have some time on your back and off-back.     If you are having an overnight in-lab sleep study, please make sure to bring toiletries, a comfy pillow, additional warm blankets, and any nighttime medications (include as-needed inhaler, pain pill, etc) that you may regularly take. Also, be sure to eat dinner before you arrive as we generally do not provide meals inside the sleep testing center. Lastly, in order to fall asleep faster in the sleep testing center, we advise patients to wake up 2 hours earlier on the morning of scheduled testing and avoid napping 2 days prior testing. Sometimes, your sleep provider may prescribe a sleep aid to be taken at lights out in the sleep testing center. If you are taking a sleep aid, consider having somebody pick you up after the sleep testing.    Overnight sleep studies may be scheduled on a weekday or weekend. We also perform daytime testing for shift workers on a case-by-case basis.    Once you have booked an appointment to do the sleep study, please contact my office for follow-up visit to discuss results.     Sleep Testing for narcolepsy: The best and ideal way is to do a baseline overnight sleep study in the sleep  laboratory called polysomnogram (PSG) followed by multiple nap trials in daytime called multiple sleep latency test (MSLT). The PSG ensures a regular night of sleep prior to the MSLT and rules out other sleep disorders such as sleep apnea or periodic limb movement disorder. In some patients who are already on CPAP at home but still have persistently excessive daytime sleepiness, the PSG is  done with CPAP on current home settings. On the other hand, the MSLT documents whether the patient falls asleep, how long it takes to fall asleep and how quickly REM sleep follows. The MSLT is done by allowing patient to have 5 nap opportunities with each nap lasting for 20 minutes at 2-hour interval.     Important preparations prior doing PSG-MSLT:    Keep a regular and consistent sleep-wake schedule 2 weeks prior to the study. Avoid having sleep deprivation.   You will be given a sleep diary that you need to fill out 2 weeks prior to testing and bring them with you to give our technologists at the time of testing. If you did not receive a copy of sleep diary, please call my office to ask for a copy.   Some of the medications you are taking may affect the results of MSLT. If you are currently taking any stimulants, please discontinue these medications at least 3-5 days prior testing. If you are taking anti-depressants, please discontinue them 2 weeks prior testing if feasible and you will need approval and tapering off instructions from your psychiatrist or prescribing provider.   For the overnight in-lab sleep study, please make sure to bring toiletries, a comfy pillow, additional warm blankets, and any nighttime medications (include as-needed inhaler, pain pill, etc) that you may regularly take.   Bring a complete list of your medications with you and please record the last time you took your medications.   Be sure to eat dinner before you arrive as we generally do not provide dinner inside the sleep testing center.   Bring something to do between the nap trials. You will not be able to sleep for 2 hours between naps.   You cannot have caffeine on the day of testing. If you normally take too much caffeine every day, consider get medical advice on how to taper off caffeine weeks prior testing to prevent having symptoms of caffeine withdrawal in the sleep laboratory.   During MSLT, there will be breakfast and lunch  "served.     IMPORTANT INFORMATION     Call 911 for medical emergencies.  Our offices are generally open from Monday-Friday, 9 am - 5 pm.  If you need to get in touch with me, you may either call me/my team (number is below) or you can use [employer].  If a referral for a test, for CPAP, or for another specialist was made, and you have not heard about scheduling this within a week, please call scheduling at [phone] ([phone]).  If you are unable to make your appointment for clinic or an overnight study, kindly call the office at least 48 hours in advance to cancel and reschedule.  If you are on CPAP, please bring your device's card or the device to each clinic appointment.   There are no supporting services by either the sleep doctors or their staff on weekends and Holidays, or after 5 PM on weekdays.     PRESCRIPTIONS     We require 7 days advanced notice for prescription refills. If we do not receive the request in this time, we cannot guarantee that your medication will be refilled in time.      IMPORTANT PHONE NUMBERS     [facility]: [phone]  [facility] (DME): [phone]  [facility] (DME): [phone]  [facility] (DME): [phone]    CONTACTING YOUR SLEEP MEDICINE PROVIDER AND SLEEP TEAM      For issues with your machine or mask interface, please call your DME provider first. DME stands for durable medical company. DME is the company who provides you the machine and/or PAP supplies / accessories.   To schedule, cancel, or reschedule SLEEP STUDY APPOINTMENTS, please call the Main Phone Line at [phone] - option 3.   To schedule, cancel, or reschedule CLINIC APPOINTMENTS, you can do it in \"MyChart\", call [phone] for [facility] office , [phone] for [facility] office to speak with my on site staff, or call the Main Phone Line at [phone] - option 2  For CLINICAL QUESTIONS or MEDICATION REFILLS, please call direct line for Adult Sleep " "Nurses at [phone].   Lastly, you can also send a message directly to your provider through \"My Chart\", which is the email service through your  Records Account: [email or web address]     Adult Sleep Nurses (Tessie Nogueira, RN and Damaris Kulkarni, RN):  For clinical questions and refilling prescriptions: [phone]  Email sleep diaries and other documents at: [email or web address]    Office locations for Heavenly Willingham NP:    [facility]   [address]   [address]  [place] [address] [phone]    [address]  [address]  [place] [address] [phone]    [address]  [address]  [place], [place] [id number] [phone]    OUR SLEEP TESTING LOCATIONS     Our team will contact you to schedule your sleep study, however, you can contact us as follow:  Main Phone Line (scheduling only): [phone]), option 3    Sleep Testing Locations:   [place] (18 years and older): [address] [place] [address], 2nd floor   [place] (18 years and older): [address]; 4th floor  After hours line: [phone]  [facility] (18 years and older) at [place]: [address] [place] [address]  After hours line: [phone]   [facility] at [facility] (Main campus: All ages): [facility], 6th floor. After hours line: [phone]   [place] (5 years and older; younger considered on case-by-case basis): [address]; [address], [address]. Scheduling  After hours line: [phone]       Here at [facility], we wish you a restful sleep!    Your sleep medicine provider for this visit was: Heavenly Willingham, APRN-CNP   "

## 2025-01-15 ENCOUNTER — CLINICAL SUPPORT (OUTPATIENT)
Dept: SLEEP MEDICINE | Facility: HOSPITAL | Age: 25
End: 2025-01-15
Payer: COMMERCIAL

## 2025-01-16 ENCOUNTER — APPOINTMENT (OUTPATIENT)
Dept: SLEEP MEDICINE | Facility: HOSPITAL | Age: 25
End: 2025-01-16
Payer: COMMERCIAL

## 2025-01-16 VITALS — WEIGHT: 279 LBS | HEIGHT: 67 IN | BODY MASS INDEX: 43.79 KG/M2

## 2025-01-16 ASSESSMENT — SLEEP AND FATIGUE QUESTIONNAIRES
HOW LIKELY ARE YOU TO NOD OFF OR FALL ASLEEP IN A CAR, WHILE STOPPED FOR A FEW MINUTES IN TRAFFIC: HIGH CHANCE OF DOZING
HOW LIKELY ARE YOU TO NOD OFF OR FALL ASLEEP WHILE LYING DOWN TO REST IN THE AFTERNOON WHEN CIRCUMSTANCES PERMIT: HIGH CHANCE OF DOZING
ESS-CHAD TOTAL SCORE: 24
HOW LIKELY ARE YOU TO NOD OFF OR FALL ASLEEP WHEN YOU ARE A PASSENGER IN A CAR FOR AN HOUR WITHOUT A BREAK: HIGH CHANCE OF DOZING
HOW LIKELY ARE YOU TO NOD OFF OR FALL ASLEEP WHILE SITTING AND READING: HIGH CHANCE OF DOZING
HOW LIKELY ARE YOU TO NOD OFF OR FALL ASLEEP WHILE SITTING AND TALKING TO SOMEONE: HIGH CHANCE OF DOZING
HOW LIKELY ARE YOU TO NOD OFF OR FALL ASLEEP WHILE SITTING QUIETLY AFTER LUNCH WITHOUT ALCOHOL: HIGH CHANCE OF DOZING
SITING INACTIVE IN A PUBLIC PLACE LIKE A CLASS ROOM OR A MOVIE THEATER: HIGH CHANCE OF DOZING
HOW LIKELY ARE YOU TO NOD OFF OR FALL ASLEEP WHILE WATCHING TV: HIGH CHANCE OF DOZING

## 2025-01-16 NOTE — PROGRESS NOTES
[facility] TECH NOTE:     Patient: Natty Coffman   MRN//AGE: [id number]  2000  24 y.o.   Technologist: Maximiliano Alcala   Room: 402   Service Date: 1/15/2052        [place] [facility]    [place]: 24    TECHNOLOGIST SLEEP STUDY PROCEDURE NOTE:   This sleep study is being conducted according to the policies and procedures outlined by the AAS accreditation standards.  The sleep study procedure and processes involved during this appointment was explained to the patient/patient’s family, questions were answered. The patient/family verbalized understanding.      The patient is a 24 y.o. year old female scheduled for a Diagnostic PSG  with montage of:  Standard PSG . She arrived for her appointment.      The study that was ultimately completed was a Diagnostic PSG  with montage of:  Standard PSG .    The full study Was completed.  Patient questionnaires completed?: yes     Consents signed? yes    Initial Fall Risk Screening:     Natty has not fallen in the last 6 months. It did not result in injury. Natty does not have a fear of falling. She does not need assistance with sitting, standing, or walking. She does not need assistance walking in her home. She does not need assistance in an unfamiliar setting. The patient is not using an assistive device.     Brief Study observations:  Patient had difficulty maintaining sleep. Patient stated the wires are uncomfortable and believes she is going through nicotine withdrawal due to not vaping during study.      Deviation to order/protocol and reason: MSLT canceled due to inadequate sleep. Respiratory events observed      If PAP, which was preferred mask/pressure/mode: N/A      Other:None    After the procedure, the patient/family was informed to ensure followup with ordering clinician for testing results.      Technologist: Maximiliano Alcala

## 2025-02-04 PROBLEM — G47.33 OSA (OBSTRUCTIVE SLEEP APNEA): Status: ACTIVE | Noted: 2025-02-04

## 2025-04-13 ENCOUNTER — HOSPITAL ENCOUNTER (EMERGENCY)
Facility: HOSPITAL | Age: 25
Discharge: LEFT BEFORE DISCHARGE | End: 2025-04-14

## 2025-04-13 VITALS
RESPIRATION RATE: 18 BRPM | WEIGHT: 279 LBS | HEART RATE: 100 BPM | BODY MASS INDEX: 43.79 KG/M2 | SYSTOLIC BLOOD PRESSURE: 139 MMHG | OXYGEN SATURATION: 98 % | TEMPERATURE: 97.7 F | DIASTOLIC BLOOD PRESSURE: 80 MMHG | HEIGHT: 67 IN

## 2025-04-13 PROCEDURE — 99281 EMR DPT VST MAYX REQ PHY/QHP: CPT

## 2025-04-13 PROCEDURE — 4500999001 HC ED NO CHARGE

## 2025-04-13 ASSESSMENT — PAIN - FUNCTIONAL ASSESSMENT: PAIN_FUNCTIONAL_ASSESSMENT: 0-10

## 2025-04-13 ASSESSMENT — PAIN DESCRIPTION - LOCATION: LOCATION: ABDOMEN

## 2025-04-13 ASSESSMENT — PAIN SCALES - GENERAL: PAINLEVEL_OUTOF10: 5 - MODERATE PAIN

## 2025-04-13 ASSESSMENT — PAIN DESCRIPTION - PAIN TYPE: TYPE: ACUTE PAIN

## 2025-04-14 NOTE — ED TRIAGE NOTES
Pt presents to the ED d/t flu like symptoms. Pt states she has been vomiting, nauseous, and has been feeling feverish. Pt also endorses abdominal pain. Pt denies any pmhx or other issues at this time.

## 2025-04-28 PROBLEM — O14.90 PRE-ECLAMPSIA: Status: RESOLVED | Noted: 2023-02-14 | Resolved: 2025-04-28

## 2025-04-28 PROBLEM — R03.0 ELEVATED BLOOD PRESSURE READING WITHOUT DIAGNOSIS OF HYPERTENSION: Status: RESOLVED | Noted: 2018-11-28 | Resolved: 2025-04-28

## 2025-04-28 PROBLEM — R11.2 NAUSEA AND VOMITING: Status: RESOLVED | Noted: 2024-01-15 | Resolved: 2025-04-28

## 2025-04-28 PROBLEM — K59.00 CONSTIPATION: Status: RESOLVED | Noted: 2022-11-08 | Resolved: 2025-04-28

## 2025-04-28 PROBLEM — G47.30 SLEEP DISORDER BREATHING: Status: RESOLVED | Noted: 2024-12-02 | Resolved: 2025-04-28

## 2025-04-28 PROBLEM — Z91.51 PERSONAL HISTORY OF SUICIDAL BEHAVIOR: Status: RESOLVED | Noted: 2023-03-20 | Resolved: 2025-04-28

## 2025-04-28 PROBLEM — R41.840 ATTENTION DISTURBANCE: Status: RESOLVED | Noted: 2023-10-14 | Resolved: 2025-04-28

## 2025-04-28 PROBLEM — H66.009 ACUTE SUPPURATIVE OTITIS MEDIA WITHOUT SPONTANEOUS RUPTURE OF EAR DRUM: Status: RESOLVED | Noted: 2024-01-15 | Resolved: 2025-04-28

## 2025-04-28 PROBLEM — G47.19 EXCESSIVE DAYTIME SLEEPINESS: Status: RESOLVED | Noted: 2024-12-02 | Resolved: 2025-04-28

## 2025-04-28 PROBLEM — B96.89 BACTERIAL VAGINOSIS: Status: RESOLVED | Noted: 2023-12-20 | Resolved: 2025-04-28

## 2025-04-28 PROBLEM — Z20.822 CONTACT WITH AND (SUSPECTED) EXPOSURE TO COVID-19: Status: RESOLVED | Noted: 2023-03-20 | Resolved: 2025-04-28

## 2025-04-28 PROBLEM — F43.22 ADJUSTMENT DISORDER WITH ANXIOUS MOOD: Status: RESOLVED | Noted: 2023-03-20 | Resolved: 2025-04-28

## 2025-04-28 PROBLEM — M25.569 KNEE PAIN: Status: RESOLVED | Noted: 2024-01-15 | Resolved: 2025-04-28

## 2025-04-28 PROBLEM — J10.1 INFLUENZA DUE TO INFLUENZA A VIRUS: Status: RESOLVED | Noted: 2024-01-15 | Resolved: 2025-04-28

## 2025-04-28 PROBLEM — N76.0 BACTERIAL VAGINOSIS: Status: RESOLVED | Noted: 2023-12-20 | Resolved: 2025-04-28

## 2025-04-28 PROBLEM — L68.0 HIRSUTISM: Status: RESOLVED | Noted: 2023-10-30 | Resolved: 2025-04-28

## 2025-04-28 PROBLEM — R10.9 ABDOMINAL PAIN: Status: RESOLVED | Noted: 2023-01-16 | Resolved: 2025-04-28

## 2025-04-28 PROBLEM — G47.33 OSA (OBSTRUCTIVE SLEEP APNEA): Status: RESOLVED | Noted: 2025-02-04 | Resolved: 2025-04-28

## 2025-04-28 PROBLEM — E66.01 MORBID OBESITY WITH BMI OF 40.0-44.9, ADULT (MULTI): Status: RESOLVED | Noted: 2024-12-02 | Resolved: 2025-04-28

## 2025-04-28 PROBLEM — R44.2 HYPNAPOMPIC HALLUCINATIONS: Status: RESOLVED | Noted: 2024-12-02 | Resolved: 2025-04-28

## 2025-04-28 NOTE — PROGRESS NOTES
"Subjective   Natty Coffman \"Natty coffman\" is a 24 y.o. female who is here for an annual gyn exam.   Concerns: Patient didn't have period since January until 4/14-current. Patient reports that she is just spotting now.     Since 2023 patient reports that normally she gets her period monthly, though some months she does skip one month. She has never skipped 3 months. Normally period lasts for 7 days.      Patient states that normally periods are heavy enough that she has to wear a pad and tampon at the same time. Normally she has cramping with her period. Cramping normally comes day 1-2 of period.     Patient reports hirsutism. Never been tested for PCOS.     Patient would like to talk about birth control. She was previously on the Nexplanon, but is not interested in that. Patient not interested in IUD. Hx of HTN. Amenable to POPs today.     Patient would like help stopping vaping.     Patient's last menstrual period was 04/14/2025 (exact date).    Last pap: 6/29/22 WNL  History of abnormal Pap smear: no  STI testing: yes - all  HPV vaccination: Yes x3    IPV screen:  Have you ever experienced any form of emotional, physical, sexual or financial abuse? no  Have you ever been hit, pushed, bitten, kicked, choked or grabbed by your partner or an ex-partner? no  Do you ever feel scared or threatened by your partner or ex-partner? no    SoHx:  Vaginal hygiene: water and soap  Loss of sexual desire: Yes, didn't like having sex with that person  Pain with sex: No  Able to orgasm: Yes   Living Situation: mom and dad  School/Employment: house keeping  Exercise: No     Substance:   Tobacco Use: Low Risk  (4/29/2025)    Patient History     Smoking Tobacco Use: Never     Smokeless Tobacco Use: Never     Passive Exposure: Not on file   Recent Concern: Tobacco Use - High Risk (4/29/2025)    Patient History     Smoking Tobacco Use: Former     Smokeless Tobacco Use: Current     Passive Exposure: Not on file      Social History "     Substance and Sexual Activity   Drug Use Yes    Types: Marijuana      Social History     Substance and Sexual Activity   Alcohol Use Yes    Alcohol/week: 3.0 standard drinks of alcohol    Types: 3 Standard drinks or equivalent per week       Social History     Substance and Sexual Activity   Sexual Activity Not Currently    Partners: Male, Female    Birth control/protection: None     OB History          1    Para   1    Term           1    AB        Living   1         SAB        IAB        Ectopic        Multiple        Live Births   1               Medical History[1]  Surgical History[2]  Family History[3]    Review of Systems   Genitourinary:  Positive for menstrual problem.   All other systems reviewed and are negative.      Objective   /85   Pulse 90   Wt 122 kg (269 lb 11.2 oz)   LMP 2025 (Exact Date)   BMI 42.24 kg/m²     Physical Exam  Constitutional:       Appearance: Normal appearance.   Cardiovascular:      Rate and Rhythm: Normal rate.   Pulmonary:      Effort: Pulmonary effort is normal.   Chest:   Breasts:     Right: Normal.      Left: Normal.   Genitourinary:     General: Normal vulva.      Vagina: Normal.      Cervix: Normal.      Comments: Moderate amount of period blood coming from cervix/vagina  Skin:     General: Skin is warm and dry.   Neurological:      Mental Status: She is alert.   Psychiatric:         Mood and Affect: Mood normal.         Behavior: Behavior normal.         Thought Content: Thought content normal.         Judgment: Judgment normal.         Assessment/Plan   Problem List Items Addressed This Visit       Abnormal uterine bleeding (AUB)    Overview   Patient reports  and March not having a menstrual cycle  LMP  to current, though today she is just spotting  Patient reports some history of irregular periods, though never missing more than 1 month  PCOS labs ordered given hx of hirsutism   Patient interested in starting on birth  control, reviewed birth control options including IUD, Nexplanon, Depo, POPs; patient opts for POPs  Reviewed how to take, what to do if pills were forgotten         Relevant Orders    17-Hydroxyprogesterone    DHEA-Sulfate    Estradiol    Follicle Stimulating Hormone    Hemoglobin A1C    Prolactin    Testosterone,Free and Total    TSH with reflex to Free T4 if abnormal    Visit for routine gyn exam    Overview   25 pap collected  STI testing ordered  HPV vaccination x3            Other Visit Diagnoses         Gynecologic exam normal    -  Primary      Screening for malignant neoplasm of cervix        Relevant Orders    THINPREP PAP      Screening examination for STI        Relevant Orders    Hepatitis C Antibody    Hepatitis B Surface Antigen    Syphilis Screen with Reflex    HIV 1/2 Antigen/Antibody Screen with Reflex to Confirmation      Encounter for tobacco use screening        Relevant Medications    nicotine polacrilex (Nicorette) 4 mg gum      Birth control counseling        Relevant Medications    drospirenone, contraceptive, (Slynd) 4 mg (28) tablet            Plan:   -discussed about breast self awareness  -reviewed mammogram starting at 40 years old unless otherwise clinically indicated   -encouraged healthy eating, exercise recommendations based of off [facility] 150 min of moderate intensity exercise a week  -RTC in 1  year for next annual or prn    MARK Castano          [1]   Past Medical History:  Diagnosis Date    Adjustment disorder with anxious mood 2023    Attention disturbance 10/14/2023    Dysmenorrhea, unspecified 2018    Menstrual cramps    Hirsutism 10/30/2023    History of pre-eclampsia     Hx of chlamydia infection     Hx of trichomoniasis     Hypertension     ISMAEL (obstructive sleep apnea) 2025    Pityriasis versicolor 2016   [2]   Past Surgical History:  Procedure Laterality Date     SECTION, LOW TRANSVERSE  2022    MR HEAD ANGIO WO IV  CONTRAST  02/20/2023    MR HEAD ANGIO WO IV CONTRAST [facility] MRI   [3]   Family History  Problem Relation Name Age of Onset    No Known Problems Mother      Hypertension Father      Diabetes Father      Diabetes Maternal Grandmother      Liver cancer Maternal Grandfather      Hypertension Paternal Grandmother      Breast cancer Neg Hx      Ovarian cancer Neg Hx      Colon cancer Neg Hx      Pancreatic cancer Neg Hx

## 2025-04-29 ENCOUNTER — OFFICE VISIT (OUTPATIENT)
Dept: OBSTETRICS AND GYNECOLOGY | Facility: CLINIC | Age: 25
End: 2025-04-29
Payer: COMMERCIAL

## 2025-04-29 VITALS
DIASTOLIC BLOOD PRESSURE: 85 MMHG | WEIGHT: 269.7 LBS | HEART RATE: 90 BPM | BODY MASS INDEX: 42.24 KG/M2 | SYSTOLIC BLOOD PRESSURE: 131 MMHG

## 2025-04-29 DIAGNOSIS — Z11.3 SCREENING EXAMINATION FOR STI: ICD-10-CM

## 2025-04-29 DIAGNOSIS — N93.9 ABNORMAL UTERINE BLEEDING (AUB): ICD-10-CM

## 2025-04-29 DIAGNOSIS — Z30.09 BIRTH CONTROL COUNSELING: ICD-10-CM

## 2025-04-29 DIAGNOSIS — Z12.4 SCREENING FOR MALIGNANT NEOPLASM OF CERVIX: ICD-10-CM

## 2025-04-29 DIAGNOSIS — Z01.419 ENCOUNTER FOR GYNECOLOGICAL EXAMINATION WITHOUT ABNORMAL FINDING: ICD-10-CM

## 2025-04-29 DIAGNOSIS — Z01.419 GYNECOLOGIC EXAM NORMAL: Primary | ICD-10-CM

## 2025-04-29 DIAGNOSIS — Z01.89 ENCOUNTER FOR TOBACCO USE SCREENING: ICD-10-CM

## 2025-04-29 PROCEDURE — 1036F TOBACCO NON-USER: CPT | Performed by: ADVANCED PRACTICE MIDWIFE

## 2025-04-29 PROCEDURE — 99395 PREV VISIT EST AGE 18-39: CPT | Mod: 25 | Performed by: ADVANCED PRACTICE MIDWIFE

## 2025-04-29 PROCEDURE — 99395 PREV VISIT EST AGE 18-39: CPT | Performed by: ADVANCED PRACTICE MIDWIFE

## 2025-04-29 PROCEDURE — 87491 CHLMYD TRACH DNA AMP PROBE: CPT | Performed by: ADVANCED PRACTICE MIDWIFE

## 2025-04-29 PROCEDURE — 87661 TRICHOMONAS VAGINALIS AMPLIF: CPT | Performed by: ADVANCED PRACTICE MIDWIFE

## 2025-04-29 RX ORDER — DIPHENHYDRAMINE HCL 25 MG
4 CAPSULE ORAL AS NEEDED
Qty: 100 EACH | Refills: 0 | Status: SHIPPED | OUTPATIENT
Start: 2025-04-29 | End: 2025-05-29

## 2025-04-29 RX ORDER — DROSPIRENONE 4 MG/1
4 TABLET, FILM COATED ORAL DAILY
Qty: 28 TABLET | Refills: 11 | Status: SHIPPED | OUTPATIENT
Start: 2025-04-29

## 2025-04-29 ASSESSMENT — ENCOUNTER SYMPTOMS
RESPIRATORY NEGATIVE: 0
NEUROLOGICAL NEGATIVE: 0
CARDIOVASCULAR NEGATIVE: 0
ENDOCRINE NEGATIVE: 0
ALLERGIC/IMMUNOLOGIC NEGATIVE: 0
HEMATOLOGIC/LYMPHATIC NEGATIVE: 0
CONSTITUTIONAL NEGATIVE: 0
GASTROINTESTINAL NEGATIVE: 0
PSYCHIATRIC NEGATIVE: 0
MUSCULOSKELETAL NEGATIVE: 0
EYES NEGATIVE: 0

## 2025-04-29 ASSESSMENT — PAIN SCALES - GENERAL: PAINLEVEL_OUTOF10: 0-NO PAIN

## 2025-05-01 LAB
C TRACH RRNA SPEC QL NAA+PROBE: NEGATIVE
N GONORRHOEA DNA SPEC QL PROBE+SIG AMP: NEGATIVE
T VAGINALIS RRNA SPEC QL NAA+PROBE: POSITIVE

## 2025-05-02 ENCOUNTER — TELEPHONE (OUTPATIENT)
Dept: OBSTETRICS AND GYNECOLOGY | Facility: CLINIC | Age: 25
End: 2025-05-02
Payer: COMMERCIAL

## 2025-05-02 DIAGNOSIS — A59.9 TRICHOMONAS INFECTION: Primary | ICD-10-CM

## 2025-05-02 DIAGNOSIS — N95.0 POSTMENOPAUSAL BLEEDING: ICD-10-CM

## 2025-05-02 RX ORDER — METRONIDAZOLE 500 MG/1
500 TABLET ORAL 2 TIMES DAILY
Qty: 14 TABLET | Refills: 0 | Status: SHIPPED | OUTPATIENT
Start: 2025-05-02 | End: 2025-05-09

## 2025-05-02 NOTE — TELEPHONE ENCOUNTER
Pt notified of + trich, need for tx, partners tx, abstinence x 7d and safer sex. Offered pt ept but she declines. Discussed elevated testosterone level and ultrasound that was ordered. Pt verbalized understanding  ----- Message from Radha Marrufo sent at 5/2/2025  1:45 PM EDT -----  Trich+; metronidazole sent  Please see if patient would like EPT  Elevated testosterone level ; will order pelvic US to confirm/not about PCOS  ----- Message -----  From: Anish Polwire Results In  Sent: 4/30/2025   5:58 AM EDT  To: MARK Castano

## 2025-05-04 LAB
17OHP SERPL-MCNC: NORMAL NG/DL
DHEA-S SERPL-MCNC: 438 MCG/DL (ref 14–349)
EST. AVERAGE GLUCOSE BLD GHB EST-MCNC: 126 MG/DL
EST. AVERAGE GLUCOSE BLD GHB EST-SCNC: 7 MMOL/L
ESTRADIOL SERPL-MCNC: 53 PG/ML
FSH SERPL-ACNC: 7.8 MIU/ML
HBA1C MFR BLD: 6 %
HBV SURFACE AG SERPL QL IA: NORMAL
HCV AB SERPL QL IA: NORMAL
HIV 1+2 AB+HIV1 P24 AG SERPL QL IA: NORMAL
PROLACTIN SERPL-MCNC: 7.4 NG/ML
T PALLIDUM AB SER QL IA: NEGATIVE
TESTOST FREE SERPL-MCNC: 8.7 PG/ML (ref 0.1–6.4)
TESTOST SERPL-MCNC: 48 NG/DL (ref 2–45)
TSH SERPL-ACNC: 0.89 MIU/L

## 2025-05-08 LAB
17OHP SERPL-MCNC: 52 NG/DL
DHEA-S SERPL-MCNC: 438 MCG/DL (ref 14–349)
EST. AVERAGE GLUCOSE BLD GHB EST-MCNC: 126 MG/DL
EST. AVERAGE GLUCOSE BLD GHB EST-SCNC: 7 MMOL/L
ESTRADIOL SERPL-MCNC: 53 PG/ML
FSH SERPL-ACNC: 7.8 MIU/ML
HBA1C MFR BLD: 6 %
HBV SURFACE AG SERPL QL IA: NORMAL
HCV AB SERPL QL IA: NORMAL
HIV 1+2 AB+HIV1 P24 AG SERPL QL IA: NORMAL
PROLACTIN SERPL-MCNC: 7.4 NG/ML
T PALLIDUM AB SER QL IA: NEGATIVE
TESTOST FREE SERPL-MCNC: 8.7 PG/ML (ref 0.1–6.4)
TESTOST SERPL-MCNC: 48 NG/DL (ref 2–45)
TSH SERPL-ACNC: 0.89 MIU/L

## 2025-05-22 LAB
CYTOLOGY CMNT CVX/VAG CYTO-IMP: NORMAL
HPV HR 12 DNA GENITAL QL NAA+PROBE: NEGATIVE
HPV HR GENOTYPES PNL CVX NAA+PROBE: NEGATIVE
HPV16 DNA SPEC QL NAA+PROBE: NEGATIVE
HPV18 DNA SPEC QL NAA+PROBE: NEGATIVE
LAB AP HPV GENOTYPE QUESTION: YES
LAB AP HPV HR: NORMAL
LAB AP PAP ADDITIONAL TESTS: NORMAL
LABORATORY COMMENT REPORT: NORMAL
LMP START DATE: NORMAL
PATH REPORT.TOTAL CANCER: NORMAL

## 2025-08-02 ENCOUNTER — CLINICAL SUPPORT (OUTPATIENT)
Dept: EMERGENCY MEDICINE | Facility: HOSPITAL | Age: 25
End: 2025-08-02

## 2025-08-02 ENCOUNTER — HOSPITAL ENCOUNTER (EMERGENCY)
Facility: HOSPITAL | Age: 25
Discharge: HOME | End: 2025-08-02
Attending: STUDENT IN AN ORGANIZED HEALTH CARE EDUCATION/TRAINING PROGRAM

## 2025-08-02 VITALS
TEMPERATURE: 98.2 F | DIASTOLIC BLOOD PRESSURE: 70 MMHG | OXYGEN SATURATION: 100 % | HEART RATE: 82 BPM | SYSTOLIC BLOOD PRESSURE: 118 MMHG | RESPIRATION RATE: 14 BRPM

## 2025-08-02 DIAGNOSIS — D50.0 IRON DEFICIENCY ANEMIA DUE TO CHRONIC BLOOD LOSS: ICD-10-CM

## 2025-08-02 DIAGNOSIS — N92.1 MENORRHAGIA WITH IRREGULAR CYCLE: Primary | ICD-10-CM

## 2025-08-02 DIAGNOSIS — D64.9 SYMPTOMATIC ANEMIA: ICD-10-CM

## 2025-08-02 LAB
ABO GROUP (TYPE) IN BLOOD: NORMAL
ALBUMIN SERPL BCP-MCNC: 4.7 G/DL (ref 3.4–5)
ALP SERPL-CCNC: 83 U/L (ref 33–110)
ALT SERPL W P-5'-P-CCNC: 8 U/L (ref 7–45)
ANION GAP SERPL CALC-SCNC: 14 MMOL/L (ref 10–20)
ANTIBODY SCREEN: NORMAL
AST SERPL W P-5'-P-CCNC: 15 U/L (ref 9–39)
BASOPHILS # BLD AUTO: 0.02 X10*3/UL (ref 0–0.1)
BASOPHILS NFR BLD AUTO: 0.3 %
BILIRUB SERPL-MCNC: 0.3 MG/DL (ref 0–1.2)
BLOOD EXPIRATION DATE: NORMAL
BUN SERPL-MCNC: 11 MG/DL (ref 6–23)
CALCIUM SERPL-MCNC: 9.6 MG/DL (ref 8.6–10.6)
CHLORIDE SERPL-SCNC: 104 MMOL/L (ref 98–107)
CO2 SERPL-SCNC: 21 MMOL/L (ref 21–32)
CREAT SERPL-MCNC: 0.76 MG/DL (ref 0.5–1.05)
D DIMER PPP FEU-MCNC: 401 NG/ML FEU
DISPENSE STATUS: NORMAL
EGFRCR SERPLBLD CKD-EPI 2021: >90 ML/MIN/1.73M*2
EOSINOPHIL # BLD AUTO: 0 X10*3/UL (ref 0–0.7)
EOSINOPHIL NFR BLD AUTO: 0 %
ERYTHROCYTE [DISTWIDTH] IN BLOOD BY AUTOMATED COUNT: 18.3 % (ref 11.5–14.5)
GLUCOSE BLD MANUAL STRIP-MCNC: 71 MG/DL (ref 74–99)
GLUCOSE SERPL-MCNC: 80 MG/DL (ref 74–99)
HCT VFR BLD AUTO: 21.2 % (ref 36–46)
HGB BLD-MCNC: 6.2 G/DL (ref 12–16)
IMM GRANULOCYTES # BLD AUTO: 0.02 X10*3/UL (ref 0–0.7)
IMM GRANULOCYTES NFR BLD AUTO: 0.3 % (ref 0–0.9)
LYMPHOCYTES # BLD AUTO: 2.43 X10*3/UL (ref 1.2–4.8)
LYMPHOCYTES NFR BLD AUTO: 33 %
MAGNESIUM SERPL-MCNC: 2.15 MG/DL (ref 1.6–2.4)
MCH RBC QN AUTO: 19.3 PG (ref 26–34)
MCHC RBC AUTO-ENTMCNC: 29.2 G/DL (ref 32–36)
MCV RBC AUTO: 66 FL (ref 80–100)
MONOCYTES # BLD AUTO: 0.26 X10*3/UL (ref 0.1–1)
MONOCYTES NFR BLD AUTO: 3.5 %
NEUTROPHILS # BLD AUTO: 4.64 X10*3/UL (ref 1.2–7.7)
NEUTROPHILS NFR BLD AUTO: 62.9 %
NRBC BLD-RTO: 0 /100 WBCS (ref 0–0)
PLATELET # BLD AUTO: 479 X10*3/UL (ref 150–450)
POTASSIUM SERPL-SCNC: 4.3 MMOL/L (ref 3.5–5.3)
PREGNANCY TEST URINE, POC: NEGATIVE
PRODUCT BLOOD TYPE: 7300
PRODUCT CODE: NORMAL
PROT SERPL-MCNC: 9 G/DL (ref 6.4–8.2)
RBC # BLD AUTO: 3.22 X10*6/UL (ref 4–5.2)
RH FACTOR (ANTIGEN D): NORMAL
SODIUM SERPL-SCNC: 135 MMOL/L (ref 136–145)
TSH SERPL-ACNC: 0.51 MIU/L (ref 0.44–3.98)
UNIT ABO: NORMAL
UNIT NUMBER: NORMAL
UNIT RH: NORMAL
UNIT VOLUME: 350
WBC # BLD AUTO: 7.4 X10*3/UL (ref 4.4–11.3)
XM INTEP: NORMAL

## 2025-08-02 PROCEDURE — 93010 ELECTROCARDIOGRAM REPORT: CPT | Performed by: STUDENT IN AN ORGANIZED HEALTH CARE EDUCATION/TRAINING PROGRAM

## 2025-08-02 PROCEDURE — 84443 ASSAY THYROID STIM HORMONE: CPT | Performed by: STUDENT IN AN ORGANIZED HEALTH CARE EDUCATION/TRAINING PROGRAM

## 2025-08-02 PROCEDURE — 81025 URINE PREGNANCY TEST: CPT

## 2025-08-02 PROCEDURE — 99291 CRITICAL CARE FIRST HOUR: CPT | Mod: 25 | Performed by: STUDENT IN AN ORGANIZED HEALTH CARE EDUCATION/TRAINING PROGRAM

## 2025-08-02 PROCEDURE — 93005 ELECTROCARDIOGRAM TRACING: CPT

## 2025-08-02 PROCEDURE — 82947 ASSAY GLUCOSE BLOOD QUANT: CPT

## 2025-08-02 PROCEDURE — 85379 FIBRIN DEGRADATION QUANT: CPT

## 2025-08-02 PROCEDURE — 86923 COMPATIBILITY TEST ELECTRIC: CPT

## 2025-08-02 PROCEDURE — 83735 ASSAY OF MAGNESIUM: CPT

## 2025-08-02 PROCEDURE — P9040 RBC LEUKOREDUCED IRRADIATED: HCPCS

## 2025-08-02 PROCEDURE — 36415 COLL VENOUS BLD VENIPUNCTURE: CPT

## 2025-08-02 PROCEDURE — 99291 CRITICAL CARE FIRST HOUR: CPT | Performed by: STUDENT IN AN ORGANIZED HEALTH CARE EDUCATION/TRAINING PROGRAM

## 2025-08-02 PROCEDURE — 86901 BLOOD TYPING SEROLOGIC RH(D): CPT

## 2025-08-02 PROCEDURE — 80053 COMPREHEN METABOLIC PANEL: CPT

## 2025-08-02 PROCEDURE — 36430 TRANSFUSION BLD/BLD COMPNT: CPT

## 2025-08-02 PROCEDURE — 99285 EMERGENCY DEPT VISIT HI MDM: CPT | Mod: 25

## 2025-08-02 PROCEDURE — 85025 COMPLETE CBC W/AUTO DIFF WBC: CPT

## 2025-08-02 NOTE — ED PROVIDER NOTES
Emergency Department Transition of Care Note       Signout   I received Natty Coffman in signout from Dr. Seo.  Please see the ED Provider Note for all HPI, PE and MDM up to the time of signout at 1600.  This is in addition to the primary record.    In brief Natty Coffman is an 24 y.o. female presenting for syncope    At the time of signout we were awaiting:  Completion of blood transfusion and reevaluation.     ED Course & Medical Decision Making   Medical Decision Making:  Under my care, she completed her transfusion and did well afterwards.    ED Course:    ED Course as of 08/02/25 1955   Sat Aug 02, 2025   1302 Preg Test, Ur: Negative [MG]   1302 POCT Glucose(!): 71 [MG]   1331 D-Dimer, Quantitative VTE Exclusion: 401  No PE by YEARS criteria [MG]   1338 HEMOGLOBIN(!!): 6.2  Patient reports a history of heavy periods, recently had period for most of the last month.  Was recently started on OCPs.  She does have an OB/GYN, but has been struggling with insurance changes.  She denies current vaginal bleeding, blood in her stool, blood in the urine, or vomiting blood. [MG]   1348 Thyroid Stimulating Hormone: 0.51  Normal [MG]   1420 Comprehensive metabolic panel(!)  No major electrolyte disturbance, normal kidney function, normal LFTs [MG]      ED Course User Index  [MG] Shell Templeton MD         Diagnoses as of 08/02/25 1955   Menorrhagia with irregular cycle   Symptomatic anemia   Iron deficiency anemia due to chronic blood loss       Disposition   Discharged home    Procedures   Procedures    Patient seen and discussed with ED attending physician.    Rachel Albright DO  Emergency Medicine      Rachel Albright DO  Resident  08/02/25 1957

## 2025-08-02 NOTE — PROGRESS NOTES
Emergency Department Transition of Care Note       Signout   I received Natty Coffman in signout from Dr. Templeton.  Please see the ED Provider Note for all HPI, PE and MDM up to the time of signout at 1700.  This is in addition to the primary record.    In brief Natty Coffman is an 24 y.o. female presenting for syncopal episode, negative PE, found to be anemic.    At the time of signout we were awaiting:  Blood transfusion 1 unit    ED Course & Medical Decision Making   Medical Decision Making:  Under my care, we were awaiting 1 unit of packed red blood cells patient has already been typed and crossed blood to arrive soon decision is made to transfuse this unit over 30 minutes.    ED Course:  ED Course as of 08/02/25 1700   Sat Aug 02, 2025   1302 Preg Test, Ur: Negative [MG]   1302 POCT Glucose(!): 71 [MG]   1331 D-Dimer, Quantitative VTE Exclusion: 401  No PE by YEARS criteria [MG]   1338 HEMOGLOBIN(!!): 6.2  Patient reports a history of heavy periods, recently had period for most of the last month.  Was recently started on OCPs.  She does have an OB/GYN, but has been struggling with insurance changes.  She denies current vaginal bleeding, blood in her stool, blood in the urine, or vomiting blood. [MG]   1348 Thyroid Stimulating Hormone: 0.51  Normal [MG]   1420 Comprehensive metabolic panel(!)  No major electrolyte disturbance, normal kidney function, normal LFTs [MG]      ED Course User Index  [MG] Shell Templeton MD         Diagnoses as of 08/02/25 1700   Menorrhagia with irregular cycle   Symptomatic anemia   Iron deficiency anemia due to chronic blood loss       Disposition   Patient was signed out to Dr. Albright at 1800.  At the time of signout 1 unit of packed red blood cells was being run with the plan to discharge after the unit was in  Procedures   Procedures    Patient seen and discussed with ED attending physician.    Simon Seo DO  Emergency Medicine

## 2025-08-02 NOTE — ED PROCEDURE NOTE
Procedure  Critical Care    Performed by: Magdaleno Webb MD  Authorized by: Magdaleno Webb MD    Critical care provider statement:     Critical care time (minutes):  40    Critical care time was exclusive of:  Separately billable procedures and treating other patients and teaching time    Critical care was necessary to treat or prevent imminent or life-threatening deterioration of the following conditions:  Other (use comment box to enter another option) (syncope with severe anemia)    Critical care was time spent personally by me on the following activities:  Development of treatment plan with patient or surrogate, evaluation of patient's response to treatment, examination of patient, obtaining history from patient or surrogate, ordering and review of radiographic studies, ordering and review of laboratory studies and ordering and performing treatments and interventions               Magdaleno Webb MD  08/02/25 [id number]

## 2025-08-05 LAB
ATRIAL RATE: 87 BPM
P AXIS: 30 DEGREES
P OFFSET: 193 MS
P ONSET: 141 MS
PR INTERVAL: 150 MS
Q ONSET: 216 MS
QRS COUNT: 14 BEATS
QRS DURATION: 94 MS
QT INTERVAL: 368 MS
QTC CALCULATION(BAZETT): 442 MS
QTC FREDERICIA: 416 MS
R AXIS: 68 DEGREES
T AXIS: 32 DEGREES
T OFFSET: 400 MS
VENTRICULAR RATE: 87 BPM

## 2025-08-26 ENCOUNTER — APPOINTMENT (OUTPATIENT)
Dept: OBSTETRICS AND GYNECOLOGY | Facility: CLINIC | Age: 25
End: 2025-08-26

## 2025-08-26 VITALS
SYSTOLIC BLOOD PRESSURE: 120 MMHG | WEIGHT: 268.5 LBS | BODY MASS INDEX: 42.05 KG/M2 | HEART RATE: 87 BPM | DIASTOLIC BLOOD PRESSURE: 83 MMHG

## 2025-08-26 DIAGNOSIS — E28.2 PCOS (POLYCYSTIC OVARIAN SYNDROME): ICD-10-CM

## 2025-08-26 DIAGNOSIS — Z30.012: ICD-10-CM

## 2025-08-26 DIAGNOSIS — Z30.09 GENERAL COUNSELLING AND ADVICE ON CONTRACEPTION: ICD-10-CM

## 2025-08-26 DIAGNOSIS — Z30.430 ENCOUNTER FOR IUD INSERTION: Primary | ICD-10-CM

## 2025-08-26 DIAGNOSIS — N92.1 MENORRHAGIA WITH IRREGULAR CYCLE: ICD-10-CM

## 2025-08-26 LAB — PREGNANCY TEST URINE, POC: NEGATIVE

## 2025-08-26 PROCEDURE — 99212 OFFICE O/P EST SF 10 MIN: CPT

## 2025-08-26 PROCEDURE — 58300 INSERT INTRAUTERINE DEVICE: CPT

## 2025-08-26 PROCEDURE — 1036F TOBACCO NON-USER: CPT

## 2025-08-26 PROCEDURE — 96372 THER/PROPH/DIAG INJ SC/IM: CPT

## 2025-08-26 PROCEDURE — 81025 URINE PREGNANCY TEST: CPT

## 2025-08-26 PROCEDURE — 2500000004 HC RX 250 GENERAL PHARMACY W/ HCPCS (ALT 636 FOR OP/ED)

## 2025-08-26 RX ORDER — KETOROLAC TROMETHAMINE 30 MG/ML
30 INJECTION, SOLUTION INTRAMUSCULAR; INTRAVENOUS ONCE
Status: COMPLETED | OUTPATIENT
Start: 2025-08-26 | End: 2025-08-26

## 2025-08-26 RX ORDER — FERROUS SULFATE 325(65) MG
325 TABLET ORAL
COMMUNITY

## 2025-08-26 RX ADMIN — LEVONORGESTREL: 52 INTRAUTERINE DEVICE INTRAUTERINE at 13:45

## 2025-08-26 RX ADMIN — KETOROLAC TROMETHAMINE 30 MG: 30 INJECTION, SOLUTION INTRAMUSCULAR; INTRAVENOUS at 14:27

## 2025-08-26 RX ADMIN — LEVONORGESTREL 52 MG: 52 INTRAUTERINE DEVICE INTRAUTERINE at 15:00

## 2025-08-26 ASSESSMENT — PAIN SCALES - GENERAL: PAINLEVEL_OUTOF10: 5

## 2025-08-26 ASSESSMENT — ENCOUNTER SYMPTOMS
GASTROINTESTINAL NEGATIVE: 0
NEUROLOGICAL NEGATIVE: 0
CONSTITUTIONAL NEGATIVE: 0
RESPIRATORY NEGATIVE: 0
ENDOCRINE NEGATIVE: 0
CARDIOVASCULAR NEGATIVE: 0
MUSCULOSKELETAL NEGATIVE: 0
EYES NEGATIVE: 0
HEMATOLOGIC/LYMPHATIC NEGATIVE: 0
PSYCHIATRIC NEGATIVE: 0
ALLERGIC/IMMUNOLOGIC NEGATIVE: 0

## 2025-08-27 LAB
C TRACH RRNA SPEC QL NAA+PROBE: NOT DETECTED
N GONORRHOEA RRNA SPEC QL NAA+PROBE: NOT DETECTED
QUEST GC CT AMPLIFIED (ALWAYS MESSAGE): NORMAL
T VAGINALIS RRNA SPEC QL NAA+PROBE: DETECTED

## 2025-08-28 DIAGNOSIS — A59.9 TRICHOMONAL INFECTION: Primary | ICD-10-CM

## 2025-08-28 RX ORDER — METRONIDAZOLE 500 MG/1
500 TABLET ORAL 2 TIMES DAILY
Qty: 14 TABLET | Refills: 0 | Status: SHIPPED | OUTPATIENT
Start: 2025-08-28 | End: 2025-09-04